# Patient Record
Sex: FEMALE | Race: BLACK OR AFRICAN AMERICAN | Employment: UNEMPLOYED | URBAN - METROPOLITAN AREA
[De-identification: names, ages, dates, MRNs, and addresses within clinical notes are randomized per-mention and may not be internally consistent; named-entity substitution may affect disease eponyms.]

---

## 2018-05-21 ENCOUNTER — HOSPITAL ENCOUNTER (OUTPATIENT)
Age: 58
Setting detail: OUTPATIENT SURGERY
Discharge: HOME | End: 2018-05-21
Attending: INTERNAL MEDICINE | Admitting: INTERNAL MEDICINE
Payer: MEDICARE

## 2018-05-21 ENCOUNTER — ANESTHESIA EVENT (OUTPATIENT)
Dept: ENDOSCOPY | Age: 58
End: 2018-05-21
Payer: MEDICARE

## 2018-05-21 ENCOUNTER — ANESTHESIA (OUTPATIENT)
Dept: ENDOSCOPY | Age: 58
End: 2018-05-21
Payer: MEDICARE

## 2018-05-21 VITALS
OXYGEN SATURATION: 98 % | DIASTOLIC BLOOD PRESSURE: 70 MMHG | BODY MASS INDEX: 35.94 KG/M2 | HEIGHT: 67 IN | TEMPERATURE: 97.8 F | WEIGHT: 229 LBS | RESPIRATION RATE: 10 BRPM | HEART RATE: 70 BPM | SYSTOLIC BLOOD PRESSURE: 126 MMHG

## 2018-05-21 LAB
H PYLORI FROM TISSUE: NEGATIVE
KIT LOT NO., HCLOLOT: NORMAL
NEGATIVE CONTROL: NEGATIVE
POSITIVE CONTROL: POSITIVE

## 2018-05-21 PROCEDURE — 76040000019: Performed by: INTERNAL MEDICINE

## 2018-05-21 PROCEDURE — 74011000250 HC RX REV CODE- 250

## 2018-05-21 PROCEDURE — 76060000031 HC ANESTHESIA FIRST 0.5 HR: Performed by: INTERNAL MEDICINE

## 2018-05-21 PROCEDURE — 77030009426 HC FCPS BIOP ENDOSC BSC -B: Performed by: INTERNAL MEDICINE

## 2018-05-21 PROCEDURE — 74011250636 HC RX REV CODE- 250/636

## 2018-05-21 PROCEDURE — 87077 CULTURE AEROBIC IDENTIFY: CPT | Performed by: INTERNAL MEDICINE

## 2018-05-21 PROCEDURE — 88305 TISSUE EXAM BY PATHOLOGIST: CPT | Performed by: INTERNAL MEDICINE

## 2018-05-21 RX ORDER — OMEPRAZOLE 40 MG/1
40 CAPSULE, DELAYED RELEASE ORAL DAILY
COMMUNITY

## 2018-05-21 RX ORDER — SODIUM CHLORIDE 9 MG/ML
INJECTION, SOLUTION INTRAVENOUS
Status: DISCONTINUED | OUTPATIENT
Start: 2018-05-21 | End: 2018-05-21 | Stop reason: HOSPADM

## 2018-05-21 RX ORDER — ATROPINE SULFATE 0.1 MG/ML
0.5 INJECTION INTRAVENOUS
Status: DISCONTINUED | OUTPATIENT
Start: 2018-05-21 | End: 2018-05-21 | Stop reason: HOSPADM

## 2018-05-21 RX ORDER — FLUMAZENIL 0.1 MG/ML
0.2 INJECTION INTRAVENOUS
Status: DISCONTINUED | OUTPATIENT
Start: 2018-05-21 | End: 2018-05-21 | Stop reason: HOSPADM

## 2018-05-21 RX ORDER — METHOCARBAMOL 750 MG/1
750 TABLET, FILM COATED ORAL 4 TIMES DAILY
COMMUNITY
End: 2018-09-04

## 2018-05-21 RX ORDER — NALOXONE HYDROCHLORIDE 0.4 MG/ML
0.4 INJECTION, SOLUTION INTRAMUSCULAR; INTRAVENOUS; SUBCUTANEOUS
Status: DISCONTINUED | OUTPATIENT
Start: 2018-05-21 | End: 2018-05-21 | Stop reason: HOSPADM

## 2018-05-21 RX ORDER — SUCRALFATE 1 G/10ML
SUSPENSION ORAL 4 TIMES DAILY
COMMUNITY
End: 2018-09-04

## 2018-05-21 RX ORDER — TACROLIMUS 1 MG/1
CAPSULE ORAL EVERY 12 HOURS
COMMUNITY

## 2018-05-21 RX ORDER — METOPROLOL SUCCINATE 25 MG/1
TABLET, EXTENDED RELEASE ORAL DAILY
COMMUNITY

## 2018-05-21 RX ORDER — SODIUM CHLORIDE 0.9 % (FLUSH) 0.9 %
5-10 SYRINGE (ML) INJECTION EVERY 8 HOURS
Status: DISCONTINUED | OUTPATIENT
Start: 2018-05-21 | End: 2018-05-21 | Stop reason: HOSPADM

## 2018-05-21 RX ORDER — ATORVASTATIN CALCIUM 40 MG/1
40 TABLET, FILM COATED ORAL
COMMUNITY

## 2018-05-21 RX ORDER — SODIUM CHLORIDE 0.9 % (FLUSH) 0.9 %
5-10 SYRINGE (ML) INJECTION AS NEEDED
Status: DISCONTINUED | OUTPATIENT
Start: 2018-05-21 | End: 2018-05-21 | Stop reason: HOSPADM

## 2018-05-21 RX ORDER — EPINEPHRINE 0.1 MG/ML
1 INJECTION INTRACARDIAC; INTRAVENOUS
Status: DISCONTINUED | OUTPATIENT
Start: 2018-05-21 | End: 2018-05-21 | Stop reason: HOSPADM

## 2018-05-21 RX ORDER — LIDOCAINE HYDROCHLORIDE 20 MG/ML
INJECTION, SOLUTION EPIDURAL; INFILTRATION; INTRACAUDAL; PERINEURAL AS NEEDED
Status: DISCONTINUED | OUTPATIENT
Start: 2018-05-21 | End: 2018-05-21 | Stop reason: HOSPADM

## 2018-05-21 RX ORDER — PROPOFOL 10 MG/ML
INJECTION, EMULSION INTRAVENOUS AS NEEDED
Status: DISCONTINUED | OUTPATIENT
Start: 2018-05-21 | End: 2018-05-21 | Stop reason: HOSPADM

## 2018-05-21 RX ORDER — FLUTICASONE PROPIONATE 50 MCG
2 SPRAY, SUSPENSION (ML) NASAL
COMMUNITY

## 2018-05-21 RX ORDER — DEXTROMETHORPHAN/PSEUDOEPHED 2.5-7.5/.8
1.2 DROPS ORAL
Status: DISCONTINUED | OUTPATIENT
Start: 2018-05-21 | End: 2018-05-21 | Stop reason: HOSPADM

## 2018-05-21 RX ORDER — MYCOPHENOLATE MOFETIL 500 MG/1
750 TABLET ORAL 2 TIMES DAILY
COMMUNITY

## 2018-05-21 RX ADMIN — SODIUM CHLORIDE: 9 INJECTION, SOLUTION INTRAVENOUS at 14:30

## 2018-05-21 RX ADMIN — PROPOFOL 50 MG: 10 INJECTION, EMULSION INTRAVENOUS at 14:51

## 2018-05-21 RX ADMIN — PROPOFOL 50 MG: 10 INJECTION, EMULSION INTRAVENOUS at 14:46

## 2018-05-21 RX ADMIN — PROPOFOL 50 MG: 10 INJECTION, EMULSION INTRAVENOUS at 14:49

## 2018-05-21 RX ADMIN — PROPOFOL 50 MG: 10 INJECTION, EMULSION INTRAVENOUS at 14:47

## 2018-05-21 RX ADMIN — LIDOCAINE HYDROCHLORIDE 100 MG: 20 INJECTION, SOLUTION EPIDURAL; INFILTRATION; INTRACAUDAL; PERINEURAL at 14:46

## 2018-05-21 RX ADMIN — PROPOFOL 50 MG: 10 INJECTION, EMULSION INTRAVENOUS at 14:54

## 2018-05-21 NOTE — DISCHARGE INSTRUCTIONS
[address]  [address] [place], [place] [address]  [phone]                     DISCHARGE INSTRUCTIONS    Excell Seek  [id number]  1960    DISCOMFORT:  Sore throat- warm salt water gargle  redness at IV site- apply warm compress to area; if redness or soreness persist- contact your physician  Gaseous discomfort- walking, belching will help relieve any discomfort  You may not operate a vehicle for 12 hours  You may not engage in an occupation involving machinery or appliances for rest of today  You may not drink alcoholic beverages for at least 12 hours  Avoid making any critical decisions for at least 24 hour  DIET  You may eat and drink after you leave. You may resume your regular diet - however -  remember your colon is empty and a heavy meal will produce gas. Avoid these foods:  vegetables, fried / greasy foods, carbonated drinks    ACTIVITY  You may resume your normal daily activities   Spend the remainder of the day resting -  avoid any strenuous activity. CALL M.D. ANY SIGN OF   Increasing pain, nausea, vomiting  Abdominal distension (swelling)  New increased bleeding (oral or rectal)  Fever (chills)  Pain in chest area    Shortness of breath    Follow-up Instructions:   Call Dr. Villareal Call for any questions or problems. we took a biopsy please call the office within 2 weeks to discuss your      Gastritis: Care Instructions  Your Care Instructions    Gastritis is a sore and upset stomach. It happens when something irritates the stomach lining. Many things can cause it. These include an infection such as the flu or something you ate or drank. Medicines or a sore on the lining of the stomach (ulcer) also can cause it. Your belly may bloat and ache. You may belch, vomit, and feel sick to your stomach. You should be able to relieve the problem by taking medicine. And it may help to change your diet. If gastritis lasts, your doctor may prescribe medicine.   Follow-up care is a key part of your treatment and safety. Be sure to make and go to all appointments, and call your doctor if you are having problems. It's also a good idea to know your test results and keep a list of the medicines you take. How can you care for yourself at home? · If your doctor prescribed antibiotics, take them as directed. Do not stop taking them just because you feel better. You need to take the full course of antibiotics. · Be safe with medicines. If your doctor prescribed medicine to decrease stomach acid, take it as directed. Call your doctor if you think you are having a problem with your medicine. · Do not take any other medicine, including over-the-counter pain relievers, without talking to your doctor first.  · If your doctor recommends over-the-counter medicine to reduce stomach acid, such as Pepcid AC, Prilosec, Tagamet HB, or Zantac 75, follow the directions on the label. · Drink plenty of fluids (enough so that your urine is light yellow or clear like water) to prevent dehydration. Choose water and other caffeine-free clear liquids. If you have kidney, heart, or liver disease and have to limit fluids, talk with your doctor before you increase the amount of fluids you drink. · Limit how much alcohol you drink. · Avoid coffee, tea, cola drinks, chocolate, and other foods with caffeine. They increase stomach acid. When should you call for help? Call 911 anytime you think you may need emergency care. For example, call if:  ? · You vomit blood or what looks like coffee grounds. ? · You pass maroon or very bloody stools. ?Call your doctor now or seek immediate medical care if:  ? · You start breathing fast and have not produced urine in the last 8 hours. ? · You cannot keep fluids down. ? Watch closely for changes in your health, and be sure to contact your doctor if:  ? · You do not get better as expected. Where can you learn more?   Go to http://shaila-mariel.info/. Enter [id number] in the search box to learn more about \"Gastritis: Care Instructions. \"  Current as of: May 12, 2017  Content Version: 11.4  © 1652-7785 [employer]. Care instructions adapted under license by [employer] (which disclaims liability or warranty for this information). If you have questions about a medical condition or this instruction, always ask your healthcare professional. [place] [address] any warranty or liability for your use of this information. pathology results. Telephone # [phone]        Continue same medications.     Post procedure diagnosis:  1.- Mild Gastritis

## 2018-05-21 NOTE — IP AVS SNAPSHOT
[address] 
[phone] Patient: Jackey Severs MRN: [id number] WWW:[id number] About your hospitalization You were admitted on:  May 21, 2018 [facility] You were discharged on:  May 21, 2018 Why you were hospitalized Your primary diagnosis was:  Not on File Follow-up Information Follow up With Details Comments Contact Info Betina Blackman MD   [address] [place] [address] Way 
[phone] Discharge Orders None A check alexandre indicates which time of day the medication should be taken. My Medications CONTINUE taking these medications Instructions Each Dose to Equal  
 Morning Noon Evening Bedtime ADVAIR DISKUS 250-50 mcg/dose diskus inhaler Generic drug:  fluticasone-salmeterol Your last dose was: Your next dose is: Take 1 Puff by inhalation every twelve (12) hours. 1 Puff  
    
   
   
   
  
 albuterol 90 mcg/actuation inhaler Commonly known as:  PROVENTIL HFA, VENTOLIN HFA, PROAIR HFA Your last dose was: Your next dose is: Take 1 Puff by inhalation. 1 Puff  
    
   
   
   
  
 atorvastatin 40 mg tablet Commonly known as:  LIPITOR Your last dose was: Your next dose is: Take 40 mg by mouth daily. 40 mg  
    
   
   
   
  
 CARAFATE 100 mg/mL suspension Generic drug:  sucralfate Your last dose was: Your next dose is: Take  by mouth four (4) times daily. famotidine 20 mg tablet Commonly known as:  PEPCID Your last dose was: Your next dose is: Take 20 mg by mouth daily. 20 mg  
    
   
   
   
  
 fluticasone 50 mcg/actuation nasal spray Commonly known as:  Star Serve Your last dose was: Your next dose is: 2 Sprays by Both Nostrils route daily. 2 Spray  
    
   
   
   
  
 furosemide 20 mg tablet Commonly known as:  LASIX Your last dose was: Your next dose is: Take 40 mg by mouth daily. 40 mg  
    
   
   
   
  
 methocarbamol 750 mg tablet Commonly known as:  ROBAXIN Your last dose was: Your next dose is: Take 750 mg by mouth four (4) times daily. 750 mg  
    
   
   
   
  
 metoprolol succinate 25 mg XL tablet Commonly known as:  TOPROL-XL Your last dose was: Your next dose is: Take  by mouth daily. mycophenolate 500 mg tablet Commonly known as:  CELLCEPT Your last dose was: Your next dose is: Take 1,500 mg by mouth two (2) times a day. 1500 mg  
    
   
   
   
  
 omeprazole 40 mg capsule Commonly known as:  PRILOSEC Your last dose was: Your next dose is: Take 40 mg by mouth daily. 40 mg  
    
   
   
   
  
 predniSONE 20 mg tablet Commonly known as:  Pansy Hummingbird Your last dose was: Your next dose is: Take 20 mg by mouth daily. 20 mg  
    
   
   
   
  
 tacrolimus 1 mg capsule Commonly known as:  PROGRAF Your last dose was: Your next dose is: Take  by mouth every twelve (12) hours. Discharge Instructions [address] [facility] 
[address] [facility] [address] [place], [address]  
[phone] DISCHARGE INSTRUCTIONS [facility] [id number] 1960 DISCOMFORT: 
Sore throat- warm salt water gargle 
redness at IV site- apply warm compress to area; if redness or soreness persist- contact your physician Gaseous discomfort- walking, belching will help relieve any discomfort You may not operate a vehicle for 12 hours You may not engage in an occupation involving machinery or appliances for rest of today You may not drink alcoholic beverages for at least 12 hours Avoid making any critical decisions for at least 24 hour DIET You may eat and drink after you leave. You may resume your regular diet  however -  remember your colon is empty and a heavy meal will produce gas. Avoid these foods:  vegetables, fried / greasy foods, carbonated drinks ACTIVITY You may resume your normal daily activities Spend the remainder of the day resting -  avoid any strenuous activity. CALL M.D. ANY SIGN OF Increasing pain, nausea, vomiting Abdominal distension (swelling) New increased bleeding (oral or rectal) Fever (chills) Pain in chest area Shortness of breath Follow-up Instructions: 
 Call Dr. Abby Lundy for any questions or problems. we took a biopsy please call the office within 2 weeks to discuss your Gastritis: Care Instructions Your Care Instructions Gastritis is a sore and upset stomach. It happens when something irritates the stomach lining. Many things can cause it. These include an infection such as the flu or something you ate or drank. Medicines or a sore on the lining of the stomach (ulcer) also can cause it. Your belly may bloat and ache. You may belch, vomit, and feel sick to your stomach. You should be able to relieve the problem by taking medicine. And it may help to change your diet. If gastritis lasts, your doctor may prescribe medicine. Follow-up care is a key part of your treatment and safety. Be sure to make and go to all appointments, and call your doctor if you are having problems. It's also a good idea to know your test results and keep a list of the medicines you take. How can you care for yourself at home? · If your doctor prescribed antibiotics, take them as directed. Do not stop taking them just because you feel better. You need to take the full course of antibiotics. · Be safe with medicines.  If your doctor prescribed medicine to decrease stomach acid, take it as directed. Call your doctor if you think you are having a problem with your medicine. · Do not take any other medicine, including over-the-counter pain relievers, without talking to your doctor first. 
· If your doctor recommends over-the-counter medicine to reduce stomach acid, such as Pepcid AC, Prilosec, Tagamet HB, or Zantac 75, follow the directions on the label. · Drink plenty of fluids (enough so that your urine is light yellow or clear like water) to prevent dehydration. Choose water and other caffeine-free clear liquids. If you have kidney, heart, or liver disease and have to limit fluids, talk with your doctor before you increase the amount of fluids you drink. · Limit how much alcohol you drink. · Avoid coffee, tea, cola drinks, chocolate, and other foods with caffeine. They increase stomach acid. When should you call for help? Call 911 anytime you think you may need emergency care. For example, call if: 
? · You vomit blood or what looks like coffee grounds. ? · You pass maroon or very bloody stools. ?Call your doctor now or seek immediate medical care if: 
? · You start breathing fast and have not produced urine in the last 8 hours. ? · You cannot keep fluids down. ? Watch closely for changes in your health, and be sure to contact your doctor if: 
? · You do not get better as expected. Where can you learn more? Go to http://shaila-mariel.info/. Enter [id number] in the search box to learn more about \"Gastritis: Care Instructions. \" Current as of: May 12, 2017 Content Version: 11.4 © 7409-7304 [employer]. Care instructions adapted under license by [employer] (which disclaims liability or warranty for this information). If you have questions about a medical condition or this instruction, always ask your healthcare professional. [address] any warranty or liability for your use of this information. pathology results. Telephone # [phone] Continue same medications. Post procedure diagnosis:  1.- Mild Gastritis Introducing [facility] [place] [facility]! [employer] introduces Herotainment patient portal. Now you can access parts of your medical record, email your doctor's office, and request medication refills online. 1. In your internet browser, go to [email or web address] 2. Click on the First Time User? Click Here link in the Sign In box. You will see the New Member Sign Up page. 3. Enter your Herotainment Access Code exactly as it appears below. You will not need to use this code after youve completed the sign-up process. If you do not sign up before the expiration date, you must request a new code. · Herotainment Access Code: [email or web address] Expires: 8/19/2018  3:10 PM 
 
4. Enter the last four digits of your Social Security Number (xxxx) and Date of Birth (mm/dd/yyyy) as indicated and click Submit. You will be taken to the next sign-up page. 5. Create a Herotainment ID. This will be your Herotainment login ID and cannot be changed, so think of one that is secure and easy to remember. 6. Create a Herotainment password. You can change your password at any time. 7. Enter your Password Reset Question and Answer. This can be used at a later time if you forget your password. 8. Enter your e-mail address. You will receive e-mail notification when new information is available in [address] 9. Click Sign Up. You can now view and download portions of your medical record. 10. Click the Download Summary menu link to download a portable copy of your medical information. If you have questions, please visit the Frequently Asked Questions section of the Herotainment website. Remember, Herotainment is NOT to be used for urgent needs. For medical emergencies, dial 911. Now available from your iPhone and Android! Introducing Dex Tony As a [facility] patient, I wanted to make you aware of our electronic visit tool called Dex HassanDune Science. Mobilligy/7 allows you to connect within minutes with a medical provider 24 hours a day, seven days a week via a mobile device or tablet or logging into a secure website from your computer. You can access Dex Opta Sportsdatabiggfin from anywhere in the [place]. A virtual visit might be right for you when you have a simple condition and feel like you just dont want to get out of bed, or cant get away from work for an appointment, when your regular Doe [facility] provider is not available (evenings, weekends or holidays), or when youre out of town and need minor care. Electronic visits cost only $49 and if the Mobilligy/7 provider determines a prescription is needed to treat your condition, one can be electronically transmitted to a nearby pharmacy*. Please take a moment to enroll today if you have not already done so. The enrollment process is free and takes just a few minutes. To enroll, please download the Mobilligy/FatTail ted to your tablet or phone, or visit [email or web address] to enroll on your computer. And, as an [address] [place] [address] patient with a [email or web address] account, the results of your visits will be scanned into your electronic medical record and your primary care provider will be able to view the scanned results. We urge you to continue to see your regular [facility] provider for your ongoing medical care. And while your primary care provider may not be the one available when you seek a Dex Tony virtual visit, the peace of mind you get from getting a real diagnosis real time can be priceless. For more information on Dex Tony, view our Frequently Asked Questions (FAQs) at [email or web address]. Sincerely, 
 
Francheska Gamboa MD 
Chief Medical Officer Katheryn8 Helen Rodriguez *:  certain medications cannot be prescribed via Dex Tony Providers Seen During Your Hospitalization Provider Specialty Primary office phone Sally Hines MD Gastroenterology [phone] Your Primary Care Physician (PCP) Primary Care Physician Office Phone Office Fax Bernice Leon [phone] You are allergic to the following No active allergies Recent Documentation Height Weight BMI OB Status Smoking Status 1.702 m 103.9 kg 35.87 kg/m2 Menopause Current Every Day Smoker Emergency Contacts Name Discharge Info Relation Home Work Mobile [facility] DISCHARGE CAREGIVER [3] Daughter [21] [phone] Patient Belongings The following personal items are in your possession at time of discharge: 
  Dental Appliances: None Please provide this summary of care documentation to your next provider. Signatures-by signing, you are acknowledging that this After Visit Summary has been reviewed with you and you have received a copy. Patient Signature:  ____________________________________________________________ Date:  ____________________________________________________________  
  
Braulio Cart Provider Signature:  ____________________________________________________________ Date:  ____________________________________________________________

## 2018-05-21 NOTE — ROUTINE PROCESS
Jay Gonzales  1960  [id number]    Situation:  Verbal report received from: Christi Chung RN  Procedure: Procedure(s):  ESOPHAGOGASTRODUODENOSCOPY (EGD)  ESOPHAGOGASTRODUODENAL (EGD) BIOPSY    Background:    Preoperative diagnosis: DYSPHAGIA, THROAT SYMPTOM-FEELS SWOLLEN, TONGUE SYMPTOMS-FEEL BUMPS BACK OF TONGUE  Postoperative diagnosis: 1.- Mild Gastritis    :  Dr. Constance Puente  Assistant(s): Endoscopy Technician-1: Roxanne Petersen  Endoscopy RN-1: Aide Núñez RN    Specimens:   ID Type Source Tests Collected by Time Destination   1 : Distal Esopahgus Biopsies Preservative   Girish Perry MD 5/21/2018 1452 Pathology   2 : Mid Esophagus Biopsies Preservative   Girish Perry MD 5/21/2018 1456 Pathology     H. Pylori  yes    Assessment:  Intra-procedure medications   Anesthesia gave intra-procedure sedation and medications, see anesthesia flow sheet yes    Intravenous fluids: NS@ KVO     Vital signs stable     Abdominal assessment: round and soft     Recommendation:  Discharge patient per MD order.     Family or Friend   Permission to share finding with family or friend yes

## 2018-05-21 NOTE — PROCEDURES
[address]  Rupali Desouza, [address]                 NAME:  Mackenzie Hutchinson   :   1960   MRN:   [id number]     Date/Time:  2018 2:58 PM    Esophagogastroduodenoscopy (EGD) Procedure Note    : Makenzie Schaefer MD    Referring Provider:  Chelita Caraballo MD    Anethesia/Sedation:  MAC anesthesia Propofol    Preoperative diagnosis: DYSPHAGIA, THROAT SYMPTOM-FEELS SWOLLEN, TONGUE SYMPTOMS-FEEL BUMPS BACK OF TONGUE    Postoperative diagnosis: 1.- Mild Gastritis    Procedure Details     After infom consent was obtained for the procedure, with all risks and benefits of procedure explained the patient was taken to the endoscopy suite and placed in the left lateral decubitus position. Following sequential administration of sedation as per above, the [id number] gastroscope was inserted into the mouth and advanced under direct vision to second portion of the duodenum. A careful inspection was made as the gastroscope was withdrawn, including a retroflexed view of the proximal stomach; findings and interventions are described below. Findings:  Esophagus:normal, random biopsies taken from distal and mid esophagus (Jar 1 and 2 respectively)  Stomach:Mild erythema in the antrum, biopsies taken for RUTHIE. Duodenum/jejunum:normal      Therapies:  none    Specimens: as listed           EBL: None    Complications:   None; patient tolerated the procedure well. Impression:    1.- Mild Gastritis    Recommendations:  -Continue acid suppression. , -Await pathology. , -Await RUTHIE test result and treat for Helicobacter pylori if positive.     Discharge disposition:  Home in the company of  when able to ambulate    Makenzie Schaefer MD

## 2018-05-21 NOTE — H&P
[address]  [address] [facility], [address]  [phone]                                History and Physical     NAME: Mona Nuno   :  1960   MRN:  [id number]     HPI:  The patient was seen and examined. Past Surgical History:   Procedure Laterality Date    HX ORTHOPAEDIC      right great toe amputation 2012    HX TRANSPLANT  2016    kidney    NEUROLOGICAL PROCEDURE UNLISTED      neck surgery     Past Medical History:   Diagnosis Date    Asthma     Chronic kidney disease     Hypertension      Social History   Substance Use Topics    Smoking status: Current Every Day Smoker     Packs/day: 0.00    Smokeless tobacco: Never Used      Comment: 1-2 cigarettes per day    Alcohol use Yes      Comment: 2-3 drinks 1-2 times a week     No Known Allergies  Family History   Problem Relation Age of Onset    Heart Attack Mother     Kidney Disease Brother      transplant    Kidney Disease Paternal Grandmother     Kidney Disease Paternal Grandfather      No current facility-administered medications for this encounter. PHYSICAL EXAM:  General: Alert, cooperative, no acute distress    HEENT: NC, Atraumatic. PERRLA, EOMI. Anicteric sclerae. Lungs:  CTA Bilaterally  Heart:  Regular  Rate and Rhythm  Abdomen: Soft, Non distended, Non tender.  +Bowel sounds  Extremities: No edema  Neurologic:  CN 2-12 grossly intact, Alert and oriented X 3. No acute neurological distress   Psych:   Good insight. Not anxious nor agitated. The heart, lungs and mental status were satisfactory for the administration of sedation and for the procedure.       Mallampati score: 3       Assessment:   · dysphagia    Plan:   · Endoscopic procedure  · MAC sedation   ·

## 2018-05-21 NOTE — ANESTHESIA POSTPROCEDURE EVALUATION
Post-Anesthesia Evaluation and Assessment    Patient: Hao Mueller MRN: [id number]  SSN: [id number]    YOB: 1960  Age: 56 y.o. Sex: female       Cardiovascular Function/Vital Signs  Visit Vitals    /82    Pulse 84    Temp 36 °C (96.8 °F)    Resp 12    Ht 5' 7\" (1.702 m)    Wt 103.9 kg (229 lb)    SpO2 95%    BMI 35.87 kg/m2       Patient is status post MAC anesthesia for Procedure(s):  ESOPHAGOGASTRODUODENOSCOPY (EGD)  ESOPHAGOGASTRODUODENAL (EGD) BIOPSY. Nausea/Vomiting: None    Postoperative hydration reviewed and adequate. Pain:  Pain Scale 1: Numeric (0 - 10) (05/21/18 1403)  Pain Intensity 1: 0 (05/21/18 1403)   Managed    Neurological Status: At baseline    Mental Status and Level of Consciousness: Arousable    Pulmonary Status:   O2 Device: CO2 nasal cannula (05/21/18 1456)   Adequate oxygenation and airway patent    Complications related to anesthesia: None    Post-anesthesia assessment completed.  No concerns    Signed By: Naomi Hurley MD     May 21, 2018

## 2018-05-21 NOTE — ANESTHESIA PREPROCEDURE EVALUATION
Anesthetic History   No history of anesthetic complications            Review of Systems / Medical History  Patient summary reviewed, nursing notes reviewed and pertinent labs reviewed    Pulmonary            Asthma : well controlled       Neuro/Psych   Within defined limits           Cardiovascular    Hypertension              Exercise tolerance: >4 METS     GI/Hepatic/Renal               Comments: Dysphagia  Renal tx Endo/Other        Morbid obesity     Other Findings            Physical Exam    Airway  Mallampati: I  TM Distance: > 6 cm  Neck ROM: normal range of motion   Mouth opening: Normal     Cardiovascular    Rhythm: regular  Rate: normal         Dental         Pulmonary  Breath sounds clear to auscultation               Abdominal         Other Findings            Anesthetic Plan    ASA: 3  Anesthesia type: MAC          Induction: Intravenous  Anesthetic plan and risks discussed with: Patient

## 2018-05-21 NOTE — PROGRESS NOTES

## 2018-06-28 ENCOUNTER — HOSPITAL ENCOUNTER (OUTPATIENT)
Dept: ULTRASOUND IMAGING | Age: 58
Discharge: HOME | End: 2018-06-28
Attending: OTOLARYNGOLOGY
Payer: MEDICARE

## 2018-06-28 DIAGNOSIS — D34 THYROID ADENOMA: ICD-10-CM

## 2018-06-28 PROCEDURE — 76536 US EXAM OF HEAD AND NECK: CPT

## 2018-08-02 ENCOUNTER — HOSPITAL ENCOUNTER (OUTPATIENT)
Dept: ULTRASOUND IMAGING | Age: 58
Discharge: HOME | End: 2018-08-02
Attending: OTOLARYNGOLOGY
Payer: MEDICARE

## 2018-08-02 DIAGNOSIS — D34 THYROID ADENOMA: ICD-10-CM

## 2018-08-02 PROCEDURE — 88172 CYTP DX EVAL FNA 1ST EA SITE: CPT | Performed by: OTOLARYNGOLOGY

## 2018-08-02 PROCEDURE — 10022 US GUIDE FINE NDL ASP W IMAGE: CPT

## 2018-08-02 PROCEDURE — 88173 CYTOPATH EVAL FNA REPORT: CPT | Performed by: OTOLARYNGOLOGY

## 2018-08-02 RX ORDER — LIDOCAINE HYDROCHLORIDE 10 MG/ML
5 INJECTION, SOLUTION EPIDURAL; INFILTRATION; INTRACAUDAL; PERINEURAL
Status: DISPENSED | OUTPATIENT
Start: 2018-08-02 | End: 2018-08-03

## 2018-08-02 NOTE — DISCHARGE INSTRUCTIONS
[facility]  Department of Radiology  [phone] Ultrasound Department  [phone] Emergency Department    BIOPSY DISCHARGE INSTRUCTIONS for Amelia [place] on 8/2/2018  General Instructions:  - A biopsy is the removal of a small piece of tissue for microscopic examination or testing.  - Healthy tissue can be obtained for the purpose of tissue-type matching for transplants. - Unhealthy tissues are more commonly biopsied to diagnose disease. General Biopsy:  - A mass can grow in any area of the body, and we are taking a specimen as ordered by your doctor. - The risks are the same. They include bleeding, pain, and infection. Home Care Instructions:  - You may resume your regular diet and medication regimen. - You may use over the counter acetaminophen (Tylenol) or ibuprofen (Advil) for the soreness. - You may apply an ice pack to the affected area for 20-30 minutes at time for the first 24 hours. -- After that, you may apply a heat pack. - You may remove the bandaid(s) tonight. - You may take a shower tonight. - Please keep the site clean and dry for 24-48 hours. - Do not soak in any kind of water (bath tub, hot tub, pool, ocean, etc) for 24-48 hours. - Do not participate in any kind of activity making you vigorously sweat for 24-48 hours. - Do not use any moisturizer/makeup/perfume on the site for 24-48 hours. Call If:  - You should call Dr. Faye Lord if you have any questions or concerns about the biopsy site. - Call if you should have increased pain, fever, redness, drainage, or bleeding more than a small spot on the bandage. Follow-Up Instructions:  - Please see Dr. Faye Lord as she has requested.

## 2018-09-04 ENCOUNTER — HOSPITAL ENCOUNTER (OUTPATIENT)
Dept: PREADMISSION TESTING | Age: 58
Discharge: HOME | End: 2018-09-04
Payer: MEDICARE

## 2018-09-04 ENCOUNTER — HOSPITAL ENCOUNTER (OUTPATIENT)
Dept: GENERAL RADIOLOGY | Age: 58
Discharge: HOME | End: 2018-09-04
Attending: OTOLARYNGOLOGY
Payer: MEDICARE

## 2018-09-04 VITALS
TEMPERATURE: 98 F | HEIGHT: 67 IN | RESPIRATION RATE: 20 BRPM | DIASTOLIC BLOOD PRESSURE: 82 MMHG | SYSTOLIC BLOOD PRESSURE: 146 MMHG | BODY MASS INDEX: 35.87 KG/M2 | HEART RATE: 69 BPM | WEIGHT: 228.5 LBS

## 2018-09-04 LAB
ALBUMIN SERPL-MCNC: 3.8 G/DL (ref 3.5–5)
ALBUMIN/GLOB SERPL: 1.2 {RATIO} (ref 1.1–2.2)
ALP SERPL-CCNC: 85 U/L (ref 45–117)
ALT SERPL-CCNC: 36 U/L (ref 12–78)
ANION GAP SERPL CALC-SCNC: 10 MMOL/L (ref 5–15)
AST SERPL-CCNC: 22 U/L (ref 15–37)
BASOPHILS # BLD: 0 K/UL (ref 0–0.1)
BASOPHILS NFR BLD: 0 % (ref 0–1)
BILIRUB SERPL-MCNC: 0.4 MG/DL (ref 0.2–1)
BUN SERPL-MCNC: 21 MG/DL (ref 6–20)
BUN/CREAT SERPL: 16 (ref 12–20)
CALCIUM SERPL-MCNC: 8.5 MG/DL (ref 8.5–10.1)
CHLORIDE SERPL-SCNC: 109 MMOL/L (ref 97–108)
CO2 SERPL-SCNC: 24 MMOL/L (ref 21–32)
CREAT SERPL-MCNC: 1.28 MG/DL (ref 0.55–1.02)
DIFFERENTIAL METHOD BLD: ABNORMAL
EOSINOPHIL # BLD: 0.1 K/UL (ref 0–0.4)
EOSINOPHIL NFR BLD: 1 % (ref 0–7)
ERYTHROCYTE [DISTWIDTH] IN BLOOD BY AUTOMATED COUNT: 13.7 % (ref 11.5–14.5)
GLOBULIN SER CALC-MCNC: 3.3 G/DL (ref 2–4)
GLUCOSE SERPL-MCNC: 158 MG/DL (ref 65–100)
HCT VFR BLD AUTO: 38.7 % (ref 35–47)
HGB BLD-MCNC: 12.5 G/DL (ref 11.5–16)
IMM GRANULOCYTES # BLD: 0 K/UL (ref 0–0.04)
IMM GRANULOCYTES NFR BLD AUTO: 0 % (ref 0–0.5)
LYMPHOCYTES # BLD: 1 K/UL (ref 0.8–3.5)
LYMPHOCYTES NFR BLD: 14 % (ref 12–49)
MCH RBC QN AUTO: 30.2 PG (ref 26–34)
MCHC RBC AUTO-ENTMCNC: 32.3 G/DL (ref 30–36.5)
MCV RBC AUTO: 93.5 FL (ref 80–99)
MONOCYTES # BLD: 0.6 K/UL (ref 0–1)
MONOCYTES NFR BLD: 9 % (ref 5–13)
NEUTS SEG # BLD: 5.7 K/UL (ref 1.8–8)
NEUTS SEG NFR BLD: 76 % (ref 32–75)
NRBC # BLD: 0 K/UL (ref 0–0.01)
NRBC BLD-RTO: 0 PER 100 WBC
PLATELET # BLD AUTO: 180 K/UL (ref 150–400)
PMV BLD AUTO: 12.4 FL (ref 8.9–12.9)
POTASSIUM SERPL-SCNC: 3.7 MMOL/L (ref 3.5–5.1)
PROT SERPL-MCNC: 7.1 G/DL (ref 6.4–8.2)
RBC # BLD AUTO: 4.14 M/UL (ref 3.8–5.2)
SODIUM SERPL-SCNC: 143 MMOL/L (ref 136–145)
TSH SERPL DL<=0.05 MIU/L-ACNC: 0.52 UIU/ML (ref 0.36–3.74)
WBC # BLD AUTO: 7.4 K/UL (ref 3.6–11)

## 2018-09-04 PROCEDURE — 84443 ASSAY THYROID STIM HORMONE: CPT | Performed by: OTOLARYNGOLOGY

## 2018-09-04 PROCEDURE — 71046 X-RAY EXAM CHEST 2 VIEWS: CPT

## 2018-09-04 PROCEDURE — 80053 COMPREHEN METABOLIC PANEL: CPT | Performed by: OTOLARYNGOLOGY

## 2018-09-04 PROCEDURE — 36415 COLL VENOUS BLD VENIPUNCTURE: CPT | Performed by: OTOLARYNGOLOGY

## 2018-09-04 PROCEDURE — 85025 COMPLETE CBC W/AUTO DIFF WBC: CPT | Performed by: OTOLARYNGOLOGY

## 2018-09-04 PROCEDURE — 93005 ELECTROCARDIOGRAM TRACING: CPT

## 2018-09-05 LAB
ATRIAL RATE: 64 BPM
CALCULATED P AXIS, ECG09: 52 DEGREES
CALCULATED R AXIS, ECG10: 43 DEGREES
CALCULATED T AXIS, ECG11: 28 DEGREES
DIAGNOSIS, 93000: NORMAL
P-R INTERVAL, ECG05: 196 MS
Q-T INTERVAL, ECG07: 420 MS
QRS DURATION, ECG06: 84 MS
QTC CALCULATION (BEZET), ECG08: 433 MS
VENTRICULAR RATE, ECG03: 64 BPM

## 2018-10-12 ENCOUNTER — ANESTHESIA EVENT (OUTPATIENT)
Dept: MEDSURG UNIT | Age: 58
End: 2018-10-12
Payer: MEDICARE

## 2018-10-12 ENCOUNTER — ANESTHESIA (OUTPATIENT)
Dept: MEDSURG UNIT | Age: 58
End: 2018-10-12
Payer: MEDICARE

## 2018-10-12 ENCOUNTER — HOSPITAL ENCOUNTER (OUTPATIENT)
Age: 58
Setting detail: OBSERVATION
Discharge: HOME | End: 2018-10-13
Attending: OTOLARYNGOLOGY | Admitting: OTOLARYNGOLOGY
Payer: MEDICARE

## 2018-10-12 DIAGNOSIS — E04.2 MULTINODULAR GOITER: Primary | ICD-10-CM

## 2018-10-12 PROBLEM — R13.10 DYSPHAGIA: Status: ACTIVE | Noted: 2018-10-12

## 2018-10-12 PROBLEM — J44.9 COPD (CHRONIC OBSTRUCTIVE PULMONARY DISEASE) (HCC): Status: ACTIVE | Noted: 2018-10-12

## 2018-10-12 LAB
CALCIUM SERPL-MCNC: 8.4 MG/DL (ref 8.5–10.1)
MAGNESIUM SERPL-MCNC: 1.6 MG/DL (ref 1.6–2.4)
PHOSPHATE SERPL-MCNC: 3.2 MG/DL (ref 2.6–4.7)

## 2018-10-12 PROCEDURE — 77030018836 HC SOL IRR NACL ICUM -A: Performed by: OTOLARYNGOLOGY

## 2018-10-12 PROCEDURE — 74011250636 HC RX REV CODE- 250/636

## 2018-10-12 PROCEDURE — 77030010120 HC SHR COAG HARMO J&J -E: Performed by: OTOLARYNGOLOGY

## 2018-10-12 PROCEDURE — 77030010938 HC CLP LIG TELE -A: Performed by: OTOLARYNGOLOGY

## 2018-10-12 PROCEDURE — 94760 N-INVAS EAR/PLS OXIMETRY 1: CPT

## 2018-10-12 PROCEDURE — 77030021052 HC RNG RETRCTR STAY COOP -A: Performed by: OTOLARYNGOLOGY

## 2018-10-12 PROCEDURE — 77030014008 HC SPNG HEMSTAT J&J -C: Performed by: OTOLARYNGOLOGY

## 2018-10-12 PROCEDURE — 74011250637 HC RX REV CODE- 250/637: Performed by: ANESTHESIOLOGY

## 2018-10-12 PROCEDURE — 77030011267 HC ELECTRD BLD COVD -A: Performed by: OTOLARYNGOLOGY

## 2018-10-12 PROCEDURE — 76210000032 HC AMBSU PH I REC 3 TO 3.5 HR: Performed by: OTOLARYNGOLOGY

## 2018-10-12 PROCEDURE — 76030000003 HC AMB SURG OR TIME 1.5 TO 2: Performed by: OTOLARYNGOLOGY

## 2018-10-12 PROCEDURE — 77030032988 HC TU ET NIM TRIVNTG EMG MEDT -D: Performed by: OTOLARYNGOLOGY

## 2018-10-12 PROCEDURE — 99218 HC RM OBSERVATION: CPT

## 2018-10-12 PROCEDURE — 77030020782 HC GWN BAIR PAWS FLX 3M -B

## 2018-10-12 PROCEDURE — 77030031139 HC SUT VCRL2 J&J -A: Performed by: OTOLARYNGOLOGY

## 2018-10-12 PROCEDURE — 74011250636 HC RX REV CODE- 250/636: Performed by: OTOLARYNGOLOGY

## 2018-10-12 PROCEDURE — 83735 ASSAY OF MAGNESIUM: CPT | Performed by: OTOLARYNGOLOGY

## 2018-10-12 PROCEDURE — 94640 AIRWAY INHALATION TREATMENT: CPT

## 2018-10-12 PROCEDURE — 74011250637 HC RX REV CODE- 250/637: Performed by: OTOLARYNGOLOGY

## 2018-10-12 PROCEDURE — 84100 ASSAY OF PHOSPHORUS: CPT | Performed by: OTOLARYNGOLOGY

## 2018-10-12 PROCEDURE — 36415 COLL VENOUS BLD VENIPUNCTURE: CPT | Performed by: OTOLARYNGOLOGY

## 2018-10-12 PROCEDURE — 82310 ASSAY OF CALCIUM: CPT | Performed by: OTOLARYNGOLOGY

## 2018-10-12 PROCEDURE — 77030026438 HC STYL ET INTUB CARD -A: Performed by: ANESTHESIOLOGY

## 2018-10-12 PROCEDURE — 77030002996 HC SUT SLK J&J -A: Performed by: OTOLARYNGOLOGY

## 2018-10-12 PROCEDURE — 74011000250 HC RX REV CODE- 250

## 2018-10-12 PROCEDURE — 74011000250 HC RX REV CODE- 250: Performed by: OTOLARYNGOLOGY

## 2018-10-12 PROCEDURE — 88307 TISSUE EXAM BY PATHOLOGIST: CPT | Performed by: OTOLARYNGOLOGY

## 2018-10-12 PROCEDURE — 74011250636 HC RX REV CODE- 250/636: Performed by: ANESTHESIOLOGY

## 2018-10-12 PROCEDURE — 76060000063 HC AMB SURG ANES 1.5 TO 2 HR: Performed by: OTOLARYNGOLOGY

## 2018-10-12 PROCEDURE — 77030008698 HC TU ET REINF MEDT -D: Performed by: ANESTHESIOLOGY

## 2018-10-12 PROCEDURE — 77030011640 HC PAD GRND REM COVD -A: Performed by: OTOLARYNGOLOGY

## 2018-10-12 PROCEDURE — 77030019655 HC PRB STIM CRAN MEDT -B: Performed by: OTOLARYNGOLOGY

## 2018-10-12 PROCEDURE — 77030002933 HC SUT MCRYL J&J -A: Performed by: OTOLARYNGOLOGY

## 2018-10-12 RX ORDER — ROCURONIUM BROMIDE 10 MG/ML
INJECTION, SOLUTION INTRAVENOUS AS NEEDED
Status: DISCONTINUED | OUTPATIENT
Start: 2018-10-12 | End: 2018-10-12 | Stop reason: HOSPADM

## 2018-10-12 RX ORDER — PROPOFOL 10 MG/ML
INJECTION, EMULSION INTRAVENOUS AS NEEDED
Status: DISCONTINUED | OUTPATIENT
Start: 2018-10-12 | End: 2018-10-12 | Stop reason: HOSPADM

## 2018-10-12 RX ORDER — ALBUTEROL SULFATE 90 UG/1
2 AEROSOL, METERED RESPIRATORY (INHALATION)
Status: DISCONTINUED | OUTPATIENT
Start: 2018-10-12 | End: 2018-10-12 | Stop reason: CLARIF

## 2018-10-12 RX ORDER — OXYCODONE AND ACETAMINOPHEN 5; 325 MG/1; MG/1
1 TABLET ORAL
Status: DISCONTINUED | OUTPATIENT
Start: 2018-10-12 | End: 2018-10-13 | Stop reason: HOSPADM

## 2018-10-12 RX ORDER — ACETAMINOPHEN 325 MG/1
650 TABLET ORAL
Status: DISCONTINUED | OUTPATIENT
Start: 2018-10-12 | End: 2018-10-13 | Stop reason: HOSPADM

## 2018-10-12 RX ORDER — FENTANYL CITRATE 50 UG/ML
INJECTION, SOLUTION INTRAMUSCULAR; INTRAVENOUS AS NEEDED
Status: DISCONTINUED | OUTPATIENT
Start: 2018-10-12 | End: 2018-10-12 | Stop reason: HOSPADM

## 2018-10-12 RX ORDER — CARBOXYMETHYLCELLULOSE SODIUM 5 MG/ML
1 SOLUTION/ DROPS OPHTHALMIC AS NEEDED
Status: DISCONTINUED | OUTPATIENT
Start: 2018-10-12 | End: 2018-10-13 | Stop reason: HOSPADM

## 2018-10-12 RX ORDER — OXYCODONE AND ACETAMINOPHEN 10; 325 MG/1; MG/1
1 TABLET ORAL
Status: DISCONTINUED | OUTPATIENT
Start: 2018-10-12 | End: 2018-10-13 | Stop reason: HOSPADM

## 2018-10-12 RX ORDER — ACETAMINOPHEN 10 MG/ML
1000 INJECTION, SOLUTION INTRAVENOUS ONCE
Status: COMPLETED | OUTPATIENT
Start: 2018-10-12 | End: 2018-10-12

## 2018-10-12 RX ORDER — SODIUM CHLORIDE, SODIUM LACTATE, POTASSIUM CHLORIDE, CALCIUM CHLORIDE 600; 310; 30; 20 MG/100ML; MG/100ML; MG/100ML; MG/100ML
INJECTION, SOLUTION INTRAVENOUS
Status: DISCONTINUED | OUTPATIENT
Start: 2018-10-12 | End: 2018-10-12 | Stop reason: HOSPADM

## 2018-10-12 RX ORDER — BUDESONIDE 0.5 MG/2ML
500 INHALANT ORAL
Status: DISCONTINUED | OUTPATIENT
Start: 2018-10-12 | End: 2018-10-13 | Stop reason: HOSPADM

## 2018-10-12 RX ORDER — ONDANSETRON 2 MG/ML
INJECTION INTRAMUSCULAR; INTRAVENOUS AS NEEDED
Status: DISCONTINUED | OUTPATIENT
Start: 2018-10-12 | End: 2018-10-12 | Stop reason: HOSPADM

## 2018-10-12 RX ORDER — FAMOTIDINE 20 MG/1
20 TABLET, FILM COATED ORAL DAILY
Status: DISCONTINUED | OUTPATIENT
Start: 2018-10-13 | End: 2018-10-13 | Stop reason: HOSPADM

## 2018-10-12 RX ORDER — FLUTICASONE PROPIONATE AND SALMETEROL 250; 50 UG/1; UG/1
1 POWDER RESPIRATORY (INHALATION) EVERY 12 HOURS
Status: DISCONTINUED | OUTPATIENT
Start: 2018-10-12 | End: 2018-10-12 | Stop reason: CLARIF

## 2018-10-12 RX ORDER — DIPHENHYDRAMINE HYDROCHLORIDE 50 MG/ML
12.5 INJECTION, SOLUTION INTRAMUSCULAR; INTRAVENOUS AS NEEDED
Status: DISCONTINUED | OUTPATIENT
Start: 2018-10-12 | End: 2018-10-12 | Stop reason: HOSPADM

## 2018-10-12 RX ORDER — LIDOCAINE HYDROCHLORIDE 10 MG/ML
0.1 INJECTION, SOLUTION EPIDURAL; INFILTRATION; INTRACAUDAL; PERINEURAL AS NEEDED
Status: DISCONTINUED | OUTPATIENT
Start: 2018-10-12 | End: 2018-10-12 | Stop reason: HOSPADM

## 2018-10-12 RX ORDER — FENTANYL CITRATE 50 UG/ML
50 INJECTION, SOLUTION INTRAMUSCULAR; INTRAVENOUS AS NEEDED
Status: DISCONTINUED | OUTPATIENT
Start: 2018-10-12 | End: 2018-10-12 | Stop reason: HOSPADM

## 2018-10-12 RX ORDER — ALBUTEROL SULFATE 0.83 MG/ML
2.5 SOLUTION RESPIRATORY (INHALATION)
Status: DISCONTINUED | OUTPATIENT
Start: 2018-10-12 | End: 2018-10-13 | Stop reason: HOSPADM

## 2018-10-12 RX ORDER — SODIUM CHLORIDE, SODIUM LACTATE, POTASSIUM CHLORIDE, CALCIUM CHLORIDE 600; 310; 30; 20 MG/100ML; MG/100ML; MG/100ML; MG/100ML
125 INJECTION, SOLUTION INTRAVENOUS CONTINUOUS
Status: DISCONTINUED | OUTPATIENT
Start: 2018-10-12 | End: 2018-10-12 | Stop reason: HOSPADM

## 2018-10-12 RX ORDER — CEFAZOLIN SODIUM IN 0.9 % NACL 2 G/100 ML
PLASTIC BAG, INJECTION (ML) INTRAVENOUS AS NEEDED
Status: DISCONTINUED | OUTPATIENT
Start: 2018-10-12 | End: 2018-10-12 | Stop reason: HOSPADM

## 2018-10-12 RX ORDER — MYCOPHENOLATE MOFETIL 250 MG/1
750 CAPSULE ORAL 2 TIMES DAILY
Status: DISCONTINUED | OUTPATIENT
Start: 2018-10-12 | End: 2018-10-13 | Stop reason: HOSPADM

## 2018-10-12 RX ORDER — SODIUM CHLORIDE 9 MG/ML
25 INJECTION, SOLUTION INTRAVENOUS CONTINUOUS
Status: DISCONTINUED | OUTPATIENT
Start: 2018-10-12 | End: 2018-10-12 | Stop reason: HOSPADM

## 2018-10-12 RX ORDER — MIDAZOLAM HYDROCHLORIDE 1 MG/ML
1 INJECTION, SOLUTION INTRAMUSCULAR; INTRAVENOUS AS NEEDED
Status: DISCONTINUED | OUTPATIENT
Start: 2018-10-12 | End: 2018-10-12 | Stop reason: HOSPADM

## 2018-10-12 RX ORDER — SODIUM CHLORIDE 0.9 % (FLUSH) 0.9 %
5-10 SYRINGE (ML) INJECTION AS NEEDED
Status: DISCONTINUED | OUTPATIENT
Start: 2018-10-12 | End: 2018-10-12 | Stop reason: HOSPADM

## 2018-10-12 RX ORDER — MIDAZOLAM HYDROCHLORIDE 1 MG/ML
INJECTION, SOLUTION INTRAMUSCULAR; INTRAVENOUS AS NEEDED
Status: DISCONTINUED | OUTPATIENT
Start: 2018-10-12 | End: 2018-10-12 | Stop reason: HOSPADM

## 2018-10-12 RX ORDER — PREDNISONE 20 MG/1
20 TABLET ORAL DAILY
Status: DISCONTINUED | OUTPATIENT
Start: 2018-10-13 | End: 2018-10-13 | Stop reason: HOSPADM

## 2018-10-12 RX ORDER — FUROSEMIDE 40 MG/1
40 TABLET ORAL DAILY
Status: DISCONTINUED | OUTPATIENT
Start: 2018-10-13 | End: 2018-10-13 | Stop reason: HOSPADM

## 2018-10-12 RX ORDER — PHENYLEPHRINE HCL IN 0.9% NACL 0.4MG/10ML
SYRINGE (ML) INTRAVENOUS AS NEEDED
Status: DISCONTINUED | OUTPATIENT
Start: 2018-10-12 | End: 2018-10-12 | Stop reason: HOSPADM

## 2018-10-12 RX ORDER — SUCCINYLCHOLINE CHLORIDE 20 MG/ML
INJECTION INTRAMUSCULAR; INTRAVENOUS AS NEEDED
Status: DISCONTINUED | OUTPATIENT
Start: 2018-10-12 | End: 2018-10-12 | Stop reason: HOSPADM

## 2018-10-12 RX ORDER — SODIUM CHLORIDE, SODIUM LACTATE, POTASSIUM CHLORIDE, CALCIUM CHLORIDE 600; 310; 30; 20 MG/100ML; MG/100ML; MG/100ML; MG/100ML
25 INJECTION, SOLUTION INTRAVENOUS CONTINUOUS
Status: DISCONTINUED | OUTPATIENT
Start: 2018-10-12 | End: 2018-10-12 | Stop reason: HOSPADM

## 2018-10-12 RX ORDER — ONDANSETRON 2 MG/ML
4 INJECTION INTRAMUSCULAR; INTRAVENOUS AS NEEDED
Status: DISCONTINUED | OUTPATIENT
Start: 2018-10-12 | End: 2018-10-12 | Stop reason: HOSPADM

## 2018-10-12 RX ORDER — MORPHINE SULFATE 10 MG/ML
2 INJECTION, SOLUTION INTRAMUSCULAR; INTRAVENOUS
Status: DISCONTINUED | OUTPATIENT
Start: 2018-10-12 | End: 2018-10-12 | Stop reason: HOSPADM

## 2018-10-12 RX ORDER — DEXMEDETOMIDINE HYDROCHLORIDE 4 UG/ML
INJECTION, SOLUTION INTRAVENOUS AS NEEDED
Status: DISCONTINUED | OUTPATIENT
Start: 2018-10-12 | End: 2018-10-12 | Stop reason: HOSPADM

## 2018-10-12 RX ORDER — PANTOPRAZOLE SODIUM 40 MG/1
40 TABLET, DELAYED RELEASE ORAL
Status: DISCONTINUED | OUTPATIENT
Start: 2018-10-13 | End: 2018-10-13 | Stop reason: HOSPADM

## 2018-10-12 RX ORDER — FENTANYL CITRATE 50 UG/ML
25 INJECTION, SOLUTION INTRAMUSCULAR; INTRAVENOUS
Status: COMPLETED | OUTPATIENT
Start: 2018-10-12 | End: 2018-10-12

## 2018-10-12 RX ORDER — LIDOCAINE HYDROCHLORIDE AND EPINEPHRINE 10; 10 MG/ML; UG/ML
1.5 INJECTION, SOLUTION INFILTRATION; PERINEURAL ONCE
Status: COMPLETED | OUTPATIENT
Start: 2018-10-12 | End: 2018-10-12

## 2018-10-12 RX ORDER — SODIUM CHLORIDE 0.9 % (FLUSH) 0.9 %
5-10 SYRINGE (ML) INJECTION EVERY 8 HOURS
Status: DISCONTINUED | OUTPATIENT
Start: 2018-10-12 | End: 2018-10-13 | Stop reason: HOSPADM

## 2018-10-12 RX ORDER — ONDANSETRON 2 MG/ML
4 INJECTION INTRAMUSCULAR; INTRAVENOUS
Status: DISCONTINUED | OUTPATIENT
Start: 2018-10-12 | End: 2018-10-13 | Stop reason: HOSPADM

## 2018-10-12 RX ORDER — LEVOTHYROXINE SODIUM 125 UG/1
125 TABLET ORAL DAILY
Status: DISCONTINUED | OUTPATIENT
Start: 2018-10-13 | End: 2018-10-13 | Stop reason: HOSPADM

## 2018-10-12 RX ORDER — SODIUM CHLORIDE 0.9 % (FLUSH) 0.9 %
5-10 SYRINGE (ML) INJECTION EVERY 8 HOURS
Status: DISCONTINUED | OUTPATIENT
Start: 2018-10-12 | End: 2018-10-12 | Stop reason: HOSPADM

## 2018-10-12 RX ORDER — DEXAMETHASONE SODIUM PHOSPHATE 4 MG/ML
INJECTION, SOLUTION INTRA-ARTICULAR; INTRALESIONAL; INTRAMUSCULAR; INTRAVENOUS; SOFT TISSUE AS NEEDED
Status: DISCONTINUED | OUTPATIENT
Start: 2018-10-12 | End: 2018-10-12 | Stop reason: HOSPADM

## 2018-10-12 RX ORDER — MIDAZOLAM HYDROCHLORIDE 1 MG/ML
0.5 INJECTION, SOLUTION INTRAMUSCULAR; INTRAVENOUS
Status: DISCONTINUED | OUTPATIENT
Start: 2018-10-12 | End: 2018-10-12 | Stop reason: HOSPADM

## 2018-10-12 RX ORDER — TACROLIMUS 1 MG/1
1 CAPSULE ORAL EVERY 12 HOURS
Status: DISCONTINUED | OUTPATIENT
Start: 2018-10-12 | End: 2018-10-13 | Stop reason: HOSPADM

## 2018-10-12 RX ORDER — DEXTROSE, SODIUM CHLORIDE, AND POTASSIUM CHLORIDE 5; .45; .15 G/100ML; G/100ML; G/100ML
25 INJECTION INTRAVENOUS CONTINUOUS
Status: DISCONTINUED | OUTPATIENT
Start: 2018-10-12 | End: 2018-10-13 | Stop reason: HOSPADM

## 2018-10-12 RX ORDER — FERROUS SULFATE, DRIED 160(50) MG
2 TABLET, EXTENDED RELEASE ORAL EVERY 6 HOURS
Status: DISCONTINUED | OUTPATIENT
Start: 2018-10-12 | End: 2018-10-13 | Stop reason: HOSPADM

## 2018-10-12 RX ORDER — METOPROLOL SUCCINATE 25 MG/1
25 TABLET, EXTENDED RELEASE ORAL DAILY
Status: DISCONTINUED | OUTPATIENT
Start: 2018-10-13 | End: 2018-10-13 | Stop reason: HOSPADM

## 2018-10-12 RX ORDER — LIDOCAINE HYDROCHLORIDE 20 MG/ML
INJECTION, SOLUTION EPIDURAL; INFILTRATION; INTRACAUDAL; PERINEURAL AS NEEDED
Status: DISCONTINUED | OUTPATIENT
Start: 2018-10-12 | End: 2018-10-12 | Stop reason: HOSPADM

## 2018-10-12 RX ORDER — OXYCODONE HYDROCHLORIDE 5 MG/1
5 TABLET ORAL AS NEEDED
Status: DISCONTINUED | OUTPATIENT
Start: 2018-10-12 | End: 2018-10-12 | Stop reason: HOSPADM

## 2018-10-12 RX ORDER — SODIUM CHLORIDE 0.9 % (FLUSH) 0.9 %
5-10 SYRINGE (ML) INJECTION AS NEEDED
Status: DISCONTINUED | OUTPATIENT
Start: 2018-10-12 | End: 2018-10-13 | Stop reason: HOSPADM

## 2018-10-12 RX ORDER — ARFORMOTEROL TARTRATE 15 UG/2ML
15 SOLUTION RESPIRATORY (INHALATION)
Status: DISCONTINUED | OUTPATIENT
Start: 2018-10-12 | End: 2018-10-13 | Stop reason: HOSPADM

## 2018-10-12 RX ADMIN — Medication 80 MCG: at 12:43

## 2018-10-12 RX ADMIN — Medication 80 MCG: at 12:22

## 2018-10-12 RX ADMIN — OXYCODONE HYDROCHLORIDE AND ACETAMINOPHEN 1 TABLET: 10; 325 TABLET ORAL at 20:00

## 2018-10-12 RX ADMIN — FENTANYL CITRATE 25 MCG: 50 INJECTION, SOLUTION INTRAMUSCULAR; INTRAVENOUS at 13:20

## 2018-10-12 RX ADMIN — ROCURONIUM BROMIDE 5 MG: 10 INJECTION, SOLUTION INTRAVENOUS at 11:28

## 2018-10-12 RX ADMIN — FENTANYL CITRATE 50 MCG: 50 INJECTION, SOLUTION INTRAMUSCULAR; INTRAVENOUS at 11:55

## 2018-10-12 RX ADMIN — SODIUM CHLORIDE, SODIUM LACTATE, POTASSIUM CHLORIDE, CALCIUM CHLORIDE: 600; 310; 30; 20 INJECTION, SOLUTION INTRAVENOUS at 11:23

## 2018-10-12 RX ADMIN — TACROLIMUS 1 MG: 1 CAPSULE ORAL at 22:36

## 2018-10-12 RX ADMIN — DEXAMETHASONE SODIUM PHOSPHATE 4 MG: 4 INJECTION, SOLUTION INTRA-ARTICULAR; INTRALESIONAL; INTRAMUSCULAR; INTRAVENOUS; SOFT TISSUE at 11:37

## 2018-10-12 RX ADMIN — ACETAMINOPHEN 1000 MG: 10 INJECTION, SOLUTION INTRAVENOUS at 13:40

## 2018-10-12 RX ADMIN — MYCOPHENOLATE MOFETIL 750 MG: 250 CAPSULE ORAL at 22:36

## 2018-10-12 RX ADMIN — SUCCINYLCHOLINE CHLORIDE 140 MG: 20 INJECTION INTRAMUSCULAR; INTRAVENOUS at 11:28

## 2018-10-12 RX ADMIN — CALCIUM CARBONATE-VITAMIN D TAB 500 MG-200 UNIT 2 TABLET: 500-200 TAB at 23:30

## 2018-10-12 RX ADMIN — ONDANSETRON 4 MG: 2 INJECTION INTRAMUSCULAR; INTRAVENOUS at 12:51

## 2018-10-12 RX ADMIN — LIDOCAINE HYDROCHLORIDE 100 MG: 20 INJECTION, SOLUTION EPIDURAL; INFILTRATION; INTRACAUDAL; PERINEURAL at 11:28

## 2018-10-12 RX ADMIN — FENTANYL CITRATE 25 MCG: 50 INJECTION, SOLUTION INTRAMUSCULAR; INTRAVENOUS at 13:33

## 2018-10-12 RX ADMIN — OXYCODONE HYDROCHLORIDE 5 MG: 5 TABLET ORAL at 15:41

## 2018-10-12 RX ADMIN — Medication 120 MCG: at 12:47

## 2018-10-12 RX ADMIN — DEXMEDETOMIDINE HYDROCHLORIDE 4 MCG: 4 INJECTION, SOLUTION INTRAVENOUS at 11:57

## 2018-10-12 RX ADMIN — MIDAZOLAM HYDROCHLORIDE 2 MG: 1 INJECTION, SOLUTION INTRAMUSCULAR; INTRAVENOUS at 11:23

## 2018-10-12 RX ADMIN — FENTANYL CITRATE 50 MCG: 50 INJECTION, SOLUTION INTRAMUSCULAR; INTRAVENOUS at 11:28

## 2018-10-12 RX ADMIN — ALBUTEROL SULFATE 2.5 MG: 2.5 SOLUTION RESPIRATORY (INHALATION) at 22:46

## 2018-10-12 RX ADMIN — FENTANYL CITRATE 25 MCG: 50 INJECTION, SOLUTION INTRAMUSCULAR; INTRAVENOUS at 13:45

## 2018-10-12 RX ADMIN — Medication 40 MCG: at 12:31

## 2018-10-12 RX ADMIN — PROPOFOL 200 MG: 10 INJECTION, EMULSION INTRAVENOUS at 11:28

## 2018-10-12 RX ADMIN — Medication 120 MCG: at 12:52

## 2018-10-12 RX ADMIN — CALCIUM CARBONATE-VITAMIN D TAB 500 MG-200 UNIT 2 TABLET: 500-200 TAB at 20:00

## 2018-10-12 RX ADMIN — FENTANYL CITRATE 25 MCG: 50 INJECTION, SOLUTION INTRAMUSCULAR; INTRAVENOUS at 13:39

## 2018-10-12 RX ADMIN — Medication 2 G: at 11:35

## 2018-10-12 RX ADMIN — DEXTROSE, SODIUM CHLORIDE, AND POTASSIUM CHLORIDE 25 ML/HR: 5; .45; .15 INJECTION INTRAVENOUS at 21:37

## 2018-10-12 RX ADMIN — DEXMEDETOMIDINE HYDROCHLORIDE 6 MCG: 4 INJECTION, SOLUTION INTRAVENOUS at 11:55

## 2018-10-12 RX ADMIN — BUDESONIDE 500 MCG: 0.5 INHALANT RESPIRATORY (INHALATION) at 22:46

## 2018-10-12 RX ADMIN — Medication 80 MCG: at 12:37

## 2018-10-12 RX ADMIN — PROPOFOL 50 MG: 10 INJECTION, EMULSION INTRAVENOUS at 12:12

## 2018-10-12 NOTE — IP AVS SNAPSHOT
[address] [facility] Cassi Diaz 13 
[phone] Patient: Nano Almazan MRN: [id number] BARBARA:[birth year] About your hospitalization You were admitted on:  October 12, 2018 You last received care in the:  [address] [place] [address] You were discharged on:  October 13, 2018 Why you were hospitalized Your primary diagnosis was:  Copd (Chronic Obstructive Pulmonary Disease) (Hcc) Your diagnoses also included:  Multinodular Goiter, Dysphagia Follow-up Information Follow up With Details Comments Contact Info Keri Vieyra MD   [address] [place] [address] 
[phone] Halie Yung MD Schedule an appointment as soon as possible for a visit in 2 weeks  [facility] 
[facility] [address] [facility] 7 [id number] [phone] Discharge Orders None A check alexandre indicates which time of day the medication should be taken. My Medications START taking these medications Instructions Each Dose to Equal  
 Morning Noon Evening Bedtime  
 calcium-vitamin D 500 mg(1,250mg) -200 unit per tablet Commonly known as:  OYSTER SHELL Your last dose was: Your next dose is: Take 2 Tabs by mouth every six (6) hours for 14 days. We will start a taper at your post op visit May be form other than oyster shell but must be calcium carbonate 2 Tab  
    
   
   
   
  
 docusate sodium 50 mg capsule Commonly known as:  Rylie Ewingo Your last dose was: Your next dose is: Take 2 Caps by mouth two (2) times daily as needed for Constipation. 100 mg  
    
   
   
   
  
 levothyroxine 125 mcg tablet Commonly known as:  SYNTHROID Your last dose was: Your next dose is: Take 1 Tab by mouth Daily (before breakfast). 125 mcg  
    
   
   
   
  
 ondansetron 8 mg disintegrating tablet Commonly known as:  ZOFRAN ODT Your last dose was: Your next dose is: Take 0.5-1 Tabs by mouth every eight (8) hours as needed for Nausea. 4-8 mg  
    
   
   
   
  
 oxyCODONE-acetaminophen 5-325 mg per tablet Commonly known as:  PERCOCET Your last dose was: Your next dose is: Take 1 Tab by mouth every six (6) hours as needed for Pain for up to 7 days. Max Daily Amount: 4 Tabs. 1 Tab CONTINUE taking these medications Instructions Each Dose to Equal  
 Morning Noon Evening Bedtime ADVAIR DISKUS 250-50 mcg/dose diskus inhaler Generic drug:  fluticasone-salmeterol Your last dose was: Your next dose is: Take 1 Puff by inhalation every twelve (12) hours. 1 Puff  
    
   
   
   
  
 albuterol 90 mcg/actuation inhaler Commonly known as:  PROVENTIL HFA, VENTOLIN HFA, PROAIR HFA Your last dose was: Your next dose is: Take 2 Puffs by inhalation as needed. 2 Puff  
    
   
   
   
  
 atorvastatin 40 mg tablet Commonly known as:  LIPITOR Your last dose was: Your next dose is: Take 40 mg by mouth nightly. 40 mg  
    
   
   
   
  
 famotidine 20 mg tablet Commonly known as:  PEPCID Your last dose was: Your next dose is: Take 20 mg by mouth daily. 20 mg  
    
   
   
   
  
 fluticasone 50 mcg/actuation nasal spray Commonly known as:  Haley Qiuntanilla Your last dose was: Your next dose is: 2 Sprays by Both Nostrils route daily as needed. 2 Spray  
    
   
   
   
  
 furosemide 20 mg tablet Commonly known as:  LASIX Your last dose was: Your next dose is: Take 40 mg by mouth daily. 40 mg  
    
   
   
   
  
 metoprolol succinate 25 mg XL tablet Commonly known as:  TOPROL-XL Your last dose was: Your next dose is: Take  by mouth daily. mycophenolate 500 mg tablet Commonly known as:  CELLCEPT Your last dose was: Your next dose is: Take 750 mg by mouth two (2) times a day. 750 mg  
    
   
   
   
  
 omeprazole 40 mg capsule Commonly known as:  PRILOSEC Your last dose was: Your next dose is: Take 40 mg by mouth daily. 40 mg  
    
   
   
   
  
 predniSONE 20 mg tablet Commonly known as:  Abad Ma Your last dose was: Your next dose is: Take 20 mg by mouth daily. 20 mg  
    
   
   
   
  
 tacrolimus 1 mg capsule Commonly known as:  PROGRAF Your last dose was: Your next dose is: Take  by mouth every twelve (12) hours. Where to Get Your Medications Information on where to get these meds will be given to you by the nurse or doctor. ! Ask your nurse or doctor about these medications  
  calcium-vitamin D 500 mg(1,250mg) -200 unit per tablet  
 docusate sodium 50 mg capsule  
 levothyroxine 125 mcg tablet  
 ondansetron 8 mg disintegrating tablet  
 oxyCODONE-acetaminophen 5-325 mg per tablet Opioid Education Prescription Opioids: What You Need to Know: 
 
Prescription opioids can be used to help relieve moderate-to-severe pain and are often prescribed following a surgery or injury, or for certain health conditions. These medications can be an important part of treatment but also come with serious risks. Opioids are strong pain medicines. Examples include hydrocodone, oxycodone, fentanyl, and morphine. Heroin is an example of an illegal opioid. It is important to work with your health care provider to make sure you are getting the safest, most effective care. WHAT ARE THE RISKS AND SIDE EFFECTS OF OPIOID USE?  
Prescription opioids carry serious risks of addiction and overdose, especially with prolonged use. An opioid overdose, often marked by slow breathing, can cause sudden death. The use of prescription opioids can have a number of side effects as well, even when taken as directed. · Tolerance-meaning you might need to take more of a medication for the same pain relief · Physical dependence-meaning you have symptoms of withdrawal when the medication is stopped. Withdrawal symptoms can include nausea, sweating, chills, diarrhea, stomach cramps, and muscle aches. Withdrawal can last up to several weeks, depending on which drug you took and how long you took it. · Increased sensitivity to pain · Constipation · Nausea, vomiting, and dry mouth · Sleepiness and dizziness · Confusion · Depression · Low levels of testosterone that can result in lower sex drive, energy, and strength · Itching and sweating RISKS ARE GREATER WITH:      
· History of drug misuse, substance use disorder, or overdose · Mental health conditions (such as depression or anxiety) · Sleep apnea · Older age (72 years or older) · Pregnancy Avoid alcohol while taking prescription opioids. Also, unless specifically advised by your health care provider, medications to avoid include: · Benzodiazepines (such as Xanax or Valium) · Muscle relaxants (such as Soma or Flexeril) · Hypnotics (such as Ambien or Lunesta) · Other prescription opioids KNOW YOUR OPTIONS Talk to your health care provider about ways to manage your pain that don't involve prescription opioids. Some of these options may actually work better and have fewer risks and side effects. Consult your physician before adding or stopping any medications, treatments, or physical activity. Options may include: 
· Pain relievers such as acetaminophen, ibuprofen, and naproxen · Some medications that are also used for depression or seizures · Physical therapy and exercise · Counseling to help patients learn how to cope better with triggers of pain and stress. · Application of heat or cold compress · Massage therapy · Relaxation techniques Be Informed Make sure you know the name of your medication, how much and how often to take it, and its potential risks & side effects. IF YOU ARE PRESCRIBED OPIOIDS FOR PAIN: 
· Never take opioids in greater amounts or more often than prescribed. Remember the goal is not to be pain-free but to manage your pain at a tolerable level. · Follow up with your primary care provider to: · Work together to create a plan on how to manage your pain. · Talk about ways to help manage your pain that don't involve prescription opioids. · Talk about any and all concerns and side effects. · Help prevent misuse and abuse. · Never sell or share prescription opioids · Help prevent misuse and abuse. · Store prescription opioids in a secure place and out of reach of others (this may include visitors, children, friends, and family). · Safely dispose of unused/unwanted prescription opioids: Find your community drug take-back program or your pharmacy mail-back program, or flush them down the toilet, following guidance from the Food and Drug Administration (www.fda.gov/Drugs/ResourcesForYou). · Visit www.cdc.gov/drugoverdose to learn about the risks of opioid abuse and overdose. · If you believe you may be struggling with addiction, tell your health care provider and ask for guidance or call [address] [place], [place] [address]ngmoco at 1-134-205-FMIK. Discharge Instructions Post Thyroidectomy Instructions Follow up: with Dr. Paulino Lefort 2 weeks after surgery to have your steristrips removed. Shortly after surgery call [phone] to schedule this appointment. ? Eat regular foods. ? You may shower in 24 hours.   Do not allow direct water pressure on your wound.  If water trickles down while washing your hair, allow the wound to dry on its own. 
 
? Do not scrub or soak your wound for 2 weeks or 14 days. ? No strenuous activity: for 14 days. ? No moving more than 15 pounds: for 14 days. Then includes pulling, pushing, tugging, throwing. ? There is generally not a lot of pain: with this surgery. Take your pain medication as needed. Most patients, if they do have pain, will have neck stiffness/discomfort. You may have numbness or tingling surrounding the area of your wound. Narcotics can cause constipation; use your Colace if this is the case. ? Nausea and vomiting: from lingering effects of general anesthesia usually resolves by the following day. The narcotic pain medication can cause nausea and vomiting. They should be taken with food or fluids to minimize this. Medications that reduce nausea and vomiting can be prescribed by your physician. ? Fever above 100.4, redness around wound, pus drainage from wound: call your doctor. ? Bleeding: is uncommon (less than 1%). If it does occur your neck will develop a fullness. It is good to take a look at your neck shortly after surgery to see what a baseline appearance is. If there are changes to this, then call your provider. \ 
 
CALL or TEXT Dr. Barbara Diaz for questions or concerns - text works best - [phone] ? Special Instructions for if you had both sides of your thyroid removed or the remainder of your thyroid removed: 
 
o You may be on Calcium (Eleonora Pencil)  it is very important that you take this. Dr. Barbara Diaz will start a taper at your follow up visit 
o If you experience tingling in the hands or around your mouth, your calcium may be dropping, go to the emergency room immediately and tell them you had your thyroid removed. You will be started on a dose of thyroid hormone replacement if you did not have high levels of thyroid hormone prior to surgery. Take this every day. Introducing [facility]! [facility] introduces Zenitumt patient portal. Now you can access parts of your medical record, email your doctor's office, and request medication refills online. 1. In your internet browser, go to [email or web address] 2. Click on the First Time User? Click Here link in the Sign In box. You will see the New Member Sign Up page. 3. Enter your [employer] Access Code exactly as it appears below. You will not need to use this code after youve completed the sign-up process. If you do not sign up before the expiration date, you must request a new code. · [employer] Access Code: [email or web address] Expires: 12/3/2018  1:31 PM 
 
4. Enter the last four digits of your Social Security Number (xxxx) and Date of Birth (mm/dd/yyyy) as indicated and click Submit. You will be taken to the next sign-up page. 5. Create a [employer] ID. This will be your [employer] login ID and cannot be changed, so think of one that is secure and easy to remember. 6. Create a [employer] password. You can change your password at any time. 7. Enter your Password Reset Question and Answer. This can be used at a later time if you forget your password. 8. Enter your e-mail address. You will receive e-mail notification when new information is available in [address] 9. Click Sign Up. You can now view and download portions of your medical record. 10. Click the Download Summary menu link to download a portable copy of your medical information. If you have questions, please visit the Frequently Asked Questions section of the [employer] website. Remember, [employer] is NOT to be used for urgent needs. For medical emergencies, dial 911. Now available from your iPhone and Android! Introducing Dex Tony As a [facility] patient, I wanted to make you aware of our electronic visit tool called Dex Tony. [facility] 24/7 allows you to connect within minutes with a medical provider 24 hours a day, seven days a week via a mobile device or tablet or logging into a secure website from your computer. You can access Farecast from anywhere in the [place]. A virtual visit might be right for you when you have a simple condition and feel like you just dont want to get out of bed, or cant get away from work for an appointment, when your regular [employer] provider is not available (evenings, weekends or holidays), or when youre out of town and need minor care. Electronic visits cost only $49 and if the [employer] 24/7 provider determines a prescription is needed to treat your condition, one can be electronically transmitted to a nearby pharmacy*. Please take a moment to enroll today if you have not already done so. The enrollment process is free and takes just a few minutes. To enroll, please download the [employer] 24/7 ted to your tablet or phone, or visit [email or web address] to enroll on your computer. And, as an [address] [place] [address] patient with a [employer] account, the results of your visits will be scanned into your electronic medical record and your primary care provider will be able to view the scanned results. We urge you to continue to see your regular [employer] provider for your ongoing medical care. And while your primary care provider may not be the one available when you seek a Dex [employer] virtual visit, the peace of mind you get from getting a real diagnosis real time can be priceless. For more information on Farecast, view our Frequently Asked Questions (FAQs) at [email or web address]. Sincerely, 
 
Leonardo Morris MD 
Chief Medical Officer [address] *:  certain medications cannot be prescribed via Sirion[employer] Providers Seen During Your Hospitalization Provider Specialty Primary office phone Hiram Plascencia MD Otolaryngology [phone] Your Primary Care Physician (PCP) Primary Care Physician Office Phone Office Fax Brandon Loera [phone] You are allergic to the following No active allergies Recent Documentation Height Weight BMI OB Status Smoking Status 1.702 m 103.6 kg 35.77 kg/m2 Menopause Current Every Day Smoker Emergency Contacts Name Discharge Info Relation Home Work Mobile [facility] DISCHARGE CAREGIVER [3] Daughter [21] [phone] ShreyaFunmilayoantonio DISCHARGE CAREGIVER [3] Brother [24] [phone] Patient Belongings The following personal items are in your possession at time of discharge: 
  Dental Appliances: None (missing teeth)  Visual Aid: Glasses             Clothing:  (clothes in bag with purse) Please provide this summary of care documentation to your next provider. Signatures-by signing, you are acknowledging that this After Visit Summary has been reviewed with you and you have received a copy. Patient Signature:  ____________________________________________________________ Date:  ____________________________________________________________  
  
[facility] Provider Signature:  ____________________________________________________________ Date:  ____________________________________________________________

## 2018-10-12 NOTE — ANESTHESIA PREPROCEDURE EVALUATION
Anesthetic History   No history of anesthetic complications            Review of Systems / Medical History  Patient summary reviewed, nursing notes reviewed and pertinent labs reviewed    Pulmonary            Asthma : well controlled       Neuro/Psych   Within defined limits           Cardiovascular    Hypertension              Exercise tolerance: >4 METS     GI/Hepatic/Renal     GERD    Renal disease      Comments: Dysphagia  Renal tx Endo/Other        Morbid obesity     Other Findings   Comments: S/p Renal tx           Physical Exam    Airway  Mallampati: I  TM Distance: > 6 cm  Neck ROM: normal range of motion   Mouth opening: Normal     Cardiovascular    Rhythm: regular  Rate: normal         Dental         Pulmonary  Breath sounds clear to auscultation               Abdominal         Other Findings            Anesthetic Plan    ASA: 3  Anesthesia type: general          Induction: Intravenous  Anesthetic plan and risks discussed with: Patient

## 2018-10-12 NOTE — ANESTHESIA POSTPROCEDURE EVALUATION
Post-Anesthesia Evaluation and Assessment Patient: Meir Young MRN: [id number]  SSN: [id number] YOB: 1960  Age: 66 y.o. Sex: female Cardiovascular Function/Vital Signs Visit Vitals  /75  Pulse 67  Temp 36.7 °C (98 °F)  Resp 15  Ht 5' 7\" (1.702 m)  Wt 103.6 kg (228 lb 6.3 oz)  SpO2 96%  BMI 35.77 kg/m2 Patient is status post general anesthesia for Procedure(s): NECK EXPLORATION, TOTAL THYROIDECTOMY. Nausea/Vomiting: None Postoperative hydration reviewed and adequate. Pain: 
Pain Scale 1: Numeric (0 - 10) (10/12/18 1345) Pain Intensity 1: 7 (10/12/18 1345) Managed Neurological Status:  
Neuro (WDL): Within Defined Limits (10/12/18 1317) At baseline Mental Status and Level of Consciousness: Arousable Pulmonary Status:  
O2 Device: Nasal cannula (10/12/18 1345) Adequate oxygenation and airway patent Complications related to anesthesia: None Post-anesthesia assessment completed. No concerns Signed By: Mason Toledo MD   
 October 12, 2018

## 2018-10-12 NOTE — IP AVS SNAPSHOT
[address] [facility] [address] [place] [address] 
[phone] Patient: Elnita Blizzard MRN: [id number] BBV:[birth year] A check alexandre indicates which time of day the medication should be taken. My Medications START taking these medications Instructions Each Dose to Equal  
 Morning Noon Evening Bedtime  
 calcium-vitamin D 500 mg(1,250mg) -200 unit per tablet Commonly known as:  OYSTER SHELL Your last dose was: Your next dose is: Take 2 Tabs by mouth every six (6) hours for 14 days. We will start a taper at your post op visit May be form other than oyster shell but must be calcium carbonate 2 Tab  
    
   
   
   
  
 docusate sodium 50 mg capsule Commonly known as:  Karissa Vargas Your last dose was: Your next dose is: Take 2 Caps by mouth two (2) times daily as needed for Constipation. 100 mg  
    
   
   
   
  
 levothyroxine 125 mcg tablet Commonly known as:  SYNTHROID Your last dose was: Your next dose is: Take 1 Tab by mouth Daily (before breakfast). 125 mcg  
    
   
   
   
  
 ondansetron 8 mg disintegrating tablet Commonly known as:  ZOFRAN ODT Your last dose was: Your next dose is: Take 0.5-1 Tabs by mouth every eight (8) hours as needed for Nausea. 4-8 mg  
    
   
   
   
  
 oxyCODONE-acetaminophen 5-325 mg per tablet Commonly known as:  PERCOCET Your last dose was: Your next dose is: Take 1 Tab by mouth every six (6) hours as needed for Pain for up to 7 days. Max Daily Amount: 4 Tabs. 1 Tab CONTINUE taking these medications Instructions Each Dose to Equal  
 Morning Noon Evening Bedtime ADVAIR DISKUS 250-50 mcg/dose diskus inhaler Generic drug:  fluticasone-salmeterol Your last dose was: Your next dose is: Take 1 Puff by inhalation every twelve (12) hours. 1 Puff  
    
   
   
   
  
 albuterol 90 mcg/actuation inhaler Commonly known as:  PROVENTIL HFA, VENTOLIN HFA, PROAIR HFA Your last dose was: Your next dose is: Take 2 Puffs by inhalation as needed. 2 Puff  
    
   
   
   
  
 atorvastatin 40 mg tablet Commonly known as:  LIPITOR Your last dose was: Your next dose is: Take 40 mg by mouth nightly. 40 mg  
    
   
   
   
  
 famotidine 20 mg tablet Commonly known as:  PEPCID Your last dose was: Your next dose is: Take 20 mg by mouth daily. 20 mg  
    
   
   
   
  
 fluticasone 50 mcg/actuation nasal spray Commonly known as:  Jeppie Pilon Your last dose was: Your next dose is: 2 Sprays by Both Nostrils route daily as needed. 2 Spray  
    
   
   
   
  
 furosemide 20 mg tablet Commonly known as:  LASIX Your last dose was: Your next dose is: Take 40 mg by mouth daily. 40 mg  
    
   
   
   
  
 metoprolol succinate 25 mg XL tablet Commonly known as:  TOPROL-XL Your last dose was: Your next dose is: Take  by mouth daily. mycophenolate 500 mg tablet Commonly known as:  CELLCEPT Your last dose was: Your next dose is: Take 750 mg by mouth two (2) times a day. 750 mg  
    
   
   
   
  
 omeprazole 40 mg capsule Commonly known as:  PRILOSEC Your last dose was: Your next dose is: Take 40 mg by mouth daily. 40 mg  
    
   
   
   
  
 predniSONE 20 mg tablet Commonly known as:  Gabriela [place] Your last dose was: Your next dose is: Take 20 mg by mouth daily. 20 mg  
    
   
   
   
  
 tacrolimus 1 mg capsule Commonly known as:  PROGRAF Your last dose was: Your next dose is: Take  by mouth every twelve (12) hours. Where to Get Your Medications Information on where to get these meds will be given to you by the nurse or doctor. ! Ask your nurse or doctor about these medications  
  calcium-vitamin D 500 mg(1,250mg) -200 unit per tablet  
 docusate sodium 50 mg capsule  
 levothyroxine 125 mcg tablet  
 ondansetron 8 mg disintegrating tablet  
 oxyCODONE-acetaminophen 5-325 mg per tablet

## 2018-10-12 NOTE — PROGRESS NOTES
TRANSFER - OUT REPORT: 
 
Verbal report given to Tonya Hernández RN on Jaycob Garrido  being transferred to [facility] for routine post - op Report consisted of patients Situation, Background, Assessment and  
Recommendations(SBAR). Information from the following report(s) SBAR, OR Summary, Procedure Summary, Intake/Output and MAR was reviewed with the receiving nurse. Lines:  
Peripheral IV 10/12/18 Right Hand (Active) Site Assessment Clean, dry, & intact 10/12/2018  1:17 PM  
Phlebitis Assessment 0 10/12/2018  1:17 PM  
Infiltration Assessment 0 10/12/2018  1:17 PM  
Dressing Status Clean, dry, & intact 10/12/2018  1:17 PM  
Dressing Type Transparent;Tape 10/12/2018  1:17 PM  
Hub Color/Line Status Pink;Patent; Infusing 10/12/2018  1:17 PM  
Alcohol Cap Used Yes 10/12/2018  1:17 PM  
  
 
Opportunity for questions and clarification was provided. Patient transported with: 
 Registered Nurse

## 2018-10-12 NOTE — PROGRESS NOTES
TRANSFER - IN REPORT: 
 
Verbal report received from THEO Harry RN(name) on [employer]  being received from [facility](unit) for routine post - op Report consisted of patients Situation, Background, Assessment and  
Recommendations(SBAR). Information from the following report(s) SBAR, Kardex, Intake/Output, MAR and Recent Results was reviewed with the receiving nurse. Opportunity for questions and clarification was provided. Assessment completed upon patients arrival to unit and care assumed.

## 2018-10-12 NOTE — OP NOTES
Date of Procedure: 12 October 2018    Pre-operative Diagnosis: Multi-nodular goiter with dominant 1.8 cm left lower thyroid nodule; FNA consistent with benign hyperplastic, colloid nodule  Post-operative Diagnosis: Multi-nodular goiter with dominant 1.8 cm left lower thyroid nodule; FNA consistent with benign hyperplastic, colloid nodule  Procedure(s):   Neck exploration   Total Thyroidectomy  Laryngeal nerve monitoring  Surgeon(s) and Role:   * Mindy Vaughn MD  Co-surgeon  * James Arshad MD, [facility]  Co-surgeon   Anesthesia: General endotracheal anesthesia (GETA)  Urine output: Not documented  Estimated Blood Loss: 2 ml   IVF: 400 ml crystalloid             Drains: None  Patient in room at 1123 hours  Antibiotic prophylaxis: ANCEF 2 g at 1135 hours  Preliminary time out at 1202 hours  Beta blocker taken the morning of surgery prior to operation  Prayer at 1206 hours  Time out for surgery at 1206 hours    (During the time out for surgery the correct patient, operative site and procedure were confirmed, along with having the necessary equipment on hand to perform the operation safely)  Start of surgery at 1207 hours   End of surgery at 1300 hours   VTE prophylaxis with bilateral lower extremity compression devices   Pressure points padded   Sponge, sharp and instrument count: Correct  History: 61-year-old euthyroid female with multi-nodular goiter and dominant left lower thyroid nodule  6/28/2018  EXAM:  US THYROID/PARATHYROID/SOFT TISS   INDICATION: Thyroid adenoma. COMPARISON: None. TECHNIQUE: Real-time sonography of the thyroid gland was performed with a high  frequency linear transducer. Multiple static images were obtained. FINDINGS:  Thyroid gland is slightly heterogeneous in echotexture. There are bilateral  thyroid nodules. In the right thyroid there is a 0.9 x 0.8 x 0.6 cm nodule in the lower pole. In the left thyroid there is a 1.1 x 0.9 x 1 cm nodule in the mid gland.   In the left lower pole/isthmus, there is suspicion of a 1.8 x 1.6 cm echogenic  nodule. The right lobe measures 5.1 x 1.9 x 2.1 cm and the left lobe measures 4.7 x 1.9  x 2.1 cm. The isthmus measures 5 mm. IMPRESSION: There is suspicion of an echogenic nodule in the left lower  pole/isthmus measuring 1.8 x 1.6.   2 other nodules are small in size      TSH 0.52 uIU/ml  No history of head or neck radiation. No personal or family history of thyroid cancer. 8/2/2018 FNA of lower left thyroid nodule consistent with benign hyperplastic, colloid nodule    Procedure (s) performed:   Neck exploration   Total Thyroidectomy  Laryngeal nerve monitoring  Specimens: Total Thyroidectomy  single short stitch right superior thyroid pole; double stitch anterior isthmus  Findings:    Symmetrically enlarged thyroid with multiple palpable nodules   Left thyroid lobe with two nodules ranging in size from 1.1 to 1.8 cm, all encapsulated, without suspicious gross manifestations   Right thyroid lobe has a 1 cm, encapsulated nodule in the right lower lobe   The right lobe measures 5 x 2 cm   The left lobe measures 4.5 x 2 cm   The isthmus measures 5 mm   No palpable central or lateral neck adenopathy.  Two normal appearing parathyroid glands (right superior and right inferior) identified and preserved on a vascular pedicle.  Two normal appearing parathyroid glands (left superior and left inferior) identified and preserved on a vascular pedicle.     Both recurrent laryngeal nerves were identified and carefully preserved throughout the entire course of the operation    Both recurrent laryngeal nerves were confirmed to be structurally and functionally intact - audible signal attained with nerve stimulator / NIM system when applied to both the right and left recurrent laryngeal nerve    Excellent hemostasis confirmed at end of operation    Pre-op holding area:   Patient seen in pre-operative holding area   Operative site marked on anterior neck  Patient was informed of the indications, nature, risks, benefits, alternatives and expected outcomes of the proposed operation:   Neck Exploration, Laryngeal Neuromonitoring, Total Thyroidectomy, possible central neck dissection  The patient voiced understanding of the aforesaid and provided informed consent to proceed   The patient asked pertinent questions, all of which were answered to her apparent satisfaction   Operative Procedure: The patient was escorted to the operating room   Upon arrival to the operative room, the patient, procedure, and operative site were confirmed via a pre-operative time-out. During the time out for surgery the correct patient, operative site and procedure were confirmed, along with having the necessary equipment on hand to perform the operation safely. All in attendance were in agreement. The patient was placed in the supine position and general anesthesia induced without incident. General endotracheal anesthesia was induced with endotracheal placement of NIM tube in order to facilitate laryngeal neuro-monitoring. The eyes were taped shut. Prophylactic antibiotic (ANCEF 2g) was administered prior to the procedure. Beta blockade was taken by the patient the morning of surgery prior to operation   With the patient in the supine position the arms were tucked, a pillow was placed behind the knees and the heels padded; the neck was slightly extended, and table elevated to 30 degrees. A roll was placed behind the scapulae to create mild degree of neck flexion  The occiput was placed on a cushion. Subcutaneous NIM monitoring system leads were placed. Pressure ulcer prophylaxis was in effect with pressure point padding.    VTE prophylaxis was also in effect with lower extremity mechanical compression devices   Sterile anterior neck/sternum prep (Chlorhexidine - alcohol) and drape   We prayed for our patient and we repeated the TIME OUT prior to commencing the operation to confirm the    correct patient,    correct operative site,    correct operative procedure and    necessary equipment on hand to conduct the operation safely. Local anesthesia (50:50 mix of 1% LIDO w/ EPI  3 ml) infiltrated subcutaneously at site of proposed anterior cervical skin incision   Low anterior, transverse cervical incision was made, 6 cm in length, two finger breadths superior to the sternoclavicular joints, and parallel to the normal skin lines of the neck. The skin, subcutaneous tissue and platysma muscle were divided   Sub-platysmal flaps were created by dissecting in the avascular plane superiorly to the thyroid cartilage and inferiorly to the suprasternal notch. Strap musculature (sternohyoid muscle and sternothyroid muscle) was  in the avascular midline (by incising the cervical linea alba), the incision extending from the suprasternal notch to the thyroid cartilage. The anterior jugular veins were ligated and divided. The anterior jugular veins empty into the jugular arch low in the neck anterior to the strap musculature. The jugular arch was ligated and divided in the space of Burns. A plane of dissection was developed between the posterior surface of the strap musculature (sternothyroid muscle) and the anterior surface of the thyroid gland. The right lateral thyroid recess was developed using gentle blunt dissection. The right thyroid lobe was gently lifted anteriorly. The right middle thyroid vein was isolated (no non-recurrent laryngeal nerve was observed). The right middle thyroid vein was controlled, ligated and divided with Harmonic scalpel on the right thyroid capsule. We then focused our attention on the right superior thyroid pole  The space was developed between the right cricothyroid muscle and medial aspect of right superior thyroid pole.    Recognizing that variations exist between the course of the superior laryngeal nerves and the first branch of the external carotid artery (the superior thyroid artery), we took great care to dissect the superior thyroid artery and its branches prior to ligation. We were careful to keep the external branch of the superior laryngeal nerve (EBSLN, which provides motor fibers to the cricothyroid muscle and the inferior pharyngeal constrictor muscle) out of harms way. The right thyroid lobe was retracted in a caudal and lateral direction. The right superior pole vessels (right superior thyroid artery and right superior thyroid vein) were skeletonized and meticulously controlled individually (with Harmonic scalpel division) on the thyroid capsule in order to avoid injury to the right superior laryngeal nerve's external branch. The right recurrent laryngeal nerve, which originates from the vagus nerve, loops around the right subclavian artery and ascends in the neck between the trachea and esophagus. The right recurrent laryngeal nerve was palpable and visually identified in the right trachea-esophageal (T-E) groove in proximity to the right inferior thyroid artery and right inferior thyroid vein. The right recurrent laryngeal nerves terminal branches provide motor innervation to all intrinsic muscles of the larynx except the cricothyroid muscle  innervated by EBSLN. The branches of the right inferior thyroid artery and branches of the right inferior thyroid vein were carefully dissected while maintaining the encountered right recurrent laryngeal nerve in view and out of harms way. The right inferior thyroid artery sends branches to both the superior and inferior parathyroid glands. The superior parathyroid gland (usually encountered at the Ligament of Berry lateral to the recurrent nerve) is more posterior in position relative to the more anteriorly situated inferior parathyroid gland (usually encountered anterior to the recurrent nerve, inferior to where the nerve crosses the inferior thyroid artery).   The right inferior thyroid artery branches were controlled on the thyroid capsule after its identified parathyroid branches were given off. Branches of the right inferior thyroid vein were controlled on the thyroid capsule with Harmonic scalpel in order to avoid injury to the right inferior parathyroid gland that was identified anterior and medial to the right recurrent laryngeal nerve. Both the inferior parathyroid gland and right recurrent laryngeal nerve were meticulously preserved intact and maintained out of harms way  The right inferior parathyroid gland was normal in size and gross appearance, and well perfused with its vasculature carefully preserved intact. Diligent search for the right superior parathyroid gland superior to the middle thyroid vein and posterior and lateral to the right recurrent laryngeal nerve identified a normal appearing, well perfused right superior parathyroid gland, which was also carefully maintained out of harms way. The right superior parathyroid gland was carefully preserved on its vascular pedicle. The right tubercle of Zukerkandl of the thyroid was identified and gently dissected free of both identified branches of the right recurrent laryngeal nerve. We recognize that this particular area where the right recurrent laryngeal nerve most closely approximates the thyroid gland and the terminal ascending branches of the inferior thyroid artery is where the recurrent nerve is at greatest risk of injury. The terminal branches of the right recurrent nerve may be non-recurrent (rarely), or may be anterior, posterior or in between the branches of the ascending right inferior thyroid artery.    The Ligament of Garcia Mokaden was identified at the posterolateral portion of the thyroid lobe just caudal to the cricoid cartilage, and it was gently dissected in order to identify its relationship to the terminal aspects of the right recurrent laryngeal nerve, which was found to be passing posterior to the Ligament of Vanessa to gain entry into the larynx. The terminal branches of the right inferior thyroid artery were meticulously controlled with bi-polar cautery taking care to spare the adjacent right recurrent laryngeal nerve. Both terminal right recurrent laryngeal nerve branches were identified and meticulously preserved, as they were seen passing under the inferior constrictor muscle, and through the cricothyroid membrane near the right cricothyroid joint, just superior to the cricoid cartilage, to enter the posterior and medial aspect of the larynx. The right thyroid lobe was further mobilized with care to the left. The attachments of the posterior right thyroid lobe and isthmus to the pre-tracheal fascia were divided with precision taking great care to avoid injury to the terminal branches of the right recurrent laryngeal nerve, which takes a more oblique course when compared to the more cephalo-caudad orientation of the recurrent laryngeal nerve on the left side of the neck, which also courses within the TE groove. The structural and functional integrity of the right recurrent laryngeal nerve was confirmed with the nerve stimulator ([employer] system), as a prominent, audible signal was attained with stimulation of the right recurrent nerve in its proximal, mid and distal cervical portions. Excellent hemostasis was confirmed along with normal superior, and inferior viable appearing/well perfused right parathyroid glands. Attention was then directed to the left neck. The same surgical principles and maneuvers were applied to the contralateral neck to complete the total thyroidectomy, briefly:  The left lateral thyroid recess was developed posterior to the left sternothyroid muscle utilizing gentle blunt dissection. The left middle thyroid was identified, dissected, ligated and divided with the Harmonic scalpel.    The space between the left cricothyroid muscle and medial aspect of the left superior thyroid pole was developed using gentle dissection. The left superior thyroid pole vessels (left superior thyroid artery and left superior thyroid vein) were meticulously controlled on the thyroid capsule with Harmonic scalpel ligation and division, taking great care to avoid injury to the left superior laryngeal nerve's external branch. The left thyroid lobe was gently mobilized anteromedially to the right. The left T-E groove was gently dissected. The left recurrent laryngeal nerve was encountered in proximity to the left inferior thyroid artery. The branches of the left inferior thyroid artery and left inferior thyroid vein were carefully controlled on the thyroid capsule in order to avoid injury and maintain blood supply to the identified, normal appearing left superior and inferior parathyroid glands. The well perfused, normal appearing left superior parathyroid gland was situated cephalad of the left middle thyroid vein, and posterior and lateral to the encountered left recurrent laryngeal nerve. Dissection anterior and medial to the left recurrent laryngeal nerve and near the left inferior thyroid pole identified a well perfused, normal appearing left inferior parathyroid gland. Both parathyroid glands appeared viable/well perfused, and were carefully preserved on their respective vascular pedicles, intact throughout the entire course of our dissection. The superior and medial aspect of the left thyroid lobe was dissected free of both identified branches of the left recurrent laryngeal nerve. Both branches of the left recurrent laryngeal nerve were seen terminating near the left cricothyroid joint, and both recurrent laryngeal nerve branches were carefully preserved. The Tubercle of Zukerkandl of the left thyroid lobe was identified and gently dissected free of both identified branches of the left recurrent laryngeal nerve.    The attachments of the posterior left thyroid lobe to the trachea were divided taking care not injure the clearly visualized terminal branches of the left recurrent laryngeal nerve. The total thyroidectomy specimen was delivered, oriented for pathological processing (single stitch right upper thyroid pole, double stitch anterior isthmus) and submitted for permanent pathology analysis. The structural and functional integrity of the left recurrent laryngeal nerve, and the right recurrent laryngeal nerve was confirmed with the nerve stimulator ([employer]), as a prominent audible signal was attained when both branches of the left and right recurrent laryngeal nerves were stimulated. A diligent search of the central neck and both lateral areas of the neck was unrevealing; specifically, there was no palpable adenopathy in either the right or left lateral (Level II, III, IV) neck, or the central compartment (Level VI and VII). Excellent hemostasis was confirmed along with two viable appearing left parathyroid glands, and two viable appearing right parathyroid glands. Excellent hemostasis in the operative field was re-confirmed under Valsalva maneuver. Surgicel was placed in the operative field as a hemostatic adjunct. The strap musculature (sternothyroid and sternohyoid muscles) was re-approximated in the midline with running 3-0 Vicryl suture. A small space was left inferiorly between the strap muscles in the case of post-operative bleeding. The platysma muscle was reconstituted with running absorbable (3-0 Vicryl) suture   The wound was irrigated with saline solution. The skin incision was closed with running absorbable subcuticular suture (4-0 Monocryl). This was an uncomplicated operation. The patient was extubated uneventfully in the operating room and transferred to the PACU in stable condition with aspiration precautions in effect   Complications: None   Implants: None  Disposition:  To PACU extubated and in stable condition with aspiration precautions in effect   Valerie Dumas MD [facility]   Karla Campuzano MD

## 2018-10-12 NOTE — PERIOP NOTES
Patient: Claude Levy MRN: [id number]  SSN: [id number] YOB: 1960  Age: 66 y.o. Sex: female Patient is status post Procedure(s): NECK EXPLORATION, TOTAL THYROIDECTOMY. Surgeon(s) and Role: Annabelle Braga MD - Primary * Angus Manley MD 
 
Local/Dose/Irrigation: see [facility] Peripheral IV 10/12/18 Right Hand (Active) Site Assessment Clean, dry, & intact 10/12/2018 11:13 AM  
Phlebitis Assessment 0 10/12/2018 11:13 AM  
Infiltration Assessment 0 10/12/2018 11:13 AM  
Dressing Status Clean, dry, & intact 10/12/2018 11:13 AM  
                 
 
 
 
Dressing/Packing:  Wound Neck Mid;Lower-DRESSING TYPE: Adhesive wound closure strips (Steri-Strips); Adhesive wound dressing (Mastisol) (10/12/18 1100) Splint/Cast:  ] Other:

## 2018-10-12 NOTE — PROGRESS NOTES
Bedside and Verbal shift change report given to [address]  (oncoming nurse) by Glenny Franco RN  (offgoing nurse). Report included the following information SBAR, Kardex, OR Summary, Procedure Summary, Intake/Output, MAR and Recent Results.

## 2018-10-12 NOTE — BRIEF OP NOTE
BRIEF OPERATIVE NOTE Date of Procedure: 12 October 2018 Pre-operative Diagnosis: Multi-nodular goiter with dominant 1.8 cm left lower thyroid nodule; FNA consistent with benign hyperplastic, colloid nodule Post-operative Diagnosis: Multi-nodular goiter with dominant 1.8 cm left lower thyroid nodule; FNA consistent with benign hyperplastic, colloid nodule Procedure(s):  
Neck exploration Total Thyroidectomy Laryngeal nerve monitoring Surgeon(s) and Role: Richard Helm MD  Co-surgeon * Brain Alba MD, SHAY FACS  Co-surgeon Anesthesia: General endotracheal anesthesia (GETA) Urine output: Not documented Estimated Blood Loss: 2 ml IVF: 400 ml crystalloid Drains: None Patient in room at 1123 hours Antibiotic prophylaxis: ANCEF 2 g at 1135 hours Preliminary time out at 1202 hours Beta blocker taken the morning of surgery prior to operation Prayer at 1206 hours Time out for surgery at 1206 hours   
(During the time out for surgery the correct patient, operative site and procedure were confirmed, along with having the necessary equipment on hand to perform the operation safely) Start of surgery at 1207 hours End of surgery at 1300 hours VTE prophylaxis with bilateral lower extremity compression devices Pressure points padded Sponge, sharp and instrument count: Correct History: 45-year-old euthyroid female with multi-nodular goiter and dominant left lower thyroid nodule 6/28/2018 EXAM:  US THYROID/PARATHYROID/SOFT TISS INDICATION: Thyroid adenoma. COMPARISON: None. TECHNIQUE: Real-time sonography of the thyroid gland was performed with a high 
frequency linear transducer. Multiple static images were obtained. FINDINGS: 
Thyroid gland is slightly heterogeneous in echotexture. There are bilateral 
thyroid nodules. In the right thyroid there is a 0.9 x 0.8 x 0.6 cm nodule in the lower pole. In the left thyroid there is a 1.1 x 0.9 x 1 cm nodule in the mid gland. In the left lower pole/isthmus, there is suspicion of a 1.8 x 1.6 cm echogenic 
nodule. The right lobe measures 5.1 x 1.9 x 2.1 cm and the left lobe measures 4.7 x 1.9 
x 2.1 cm. The isthmus measures 5 mm. IMPRESSION: There is suspicion of an echogenic nodule in the left lower 
pole/isthmus measuring 1.8 x 1.6.  
2 other nodules are small in size TSH 0.52 uIU/ml No history of head or neck radiation. No personal or family history of thyroid cancer. 8/2/2018 FNA of lower left thyroid nodule consistent with benign hyperplastic, colloid nodule Procedure (s) performed:  
Neck exploration Total Thyroidectomy Laryngeal nerve monitoring Specimens: Total Thyroidectomy  single short stitch right superior thyroid pole; double stitch anterior isthmus Findings: ? Symmetrically enlarged thyroid with multiple palpable nodules ? Left thyroid lobe with two nodules ranging in size from 1.1 to 1.8 cm, all encapsulated, without suspicious gross manifestations ? Right thyroid lobe has a 1 cm, encapsulated nodule in the right lower lobe ? The right lobe measures 5 x 2 cm ? The left lobe measures 4.5 x 2 cm ? The isthmus measures 5 mm ? No palpable central or lateral neck adenopathy. ? Two normal appearing parathyroid glands (right superior and right inferior) identified and preserved on a vascular pedicle. ? Two normal appearing parathyroid glands (left superior and left inferior) identified and preserved on a vascular pedicle. ? Both recurrent laryngeal nerves were identified and carefully preserved throughout the entire course of the operation ? Both recurrent laryngeal nerves were confirmed to be structurally and functionally intact - audible signal attained with nerve stimulator / NIM system when applied to both the right and left recurrent laryngeal nerve ? Excellent hemostasis confirmed at end of operation Surgical Staff: 
Circ-1: Antonio Joyner RN Registered Nurse Assistant: Vicenta Machado RN Scrub RN-1: Ora Clifford RN Event Time In Incision Start 91 21 06 Incision Close 1258 Specimens:  
ID Type Source Tests Collected by Time Destination 1 : TOTAL THYROIDECTOMY Fresh Thyroid  Hiram Plascencia MD 10/12/2018 1139 Pathology Operative Procedure: The patient was escorted to the operating room Upon arrival to the operative room, the patient, procedure, and operative site were confirmed via a pre-operative time-out. During the time out for surgery the correct patient, operative site and procedure were confirmed, along with having the necessary equipment on hand to perform the operation safely. All in attendance were in agreement. The patient was placed in the supine position and general anesthesia induced without incident. General endotracheal anesthesia was induced with endotracheal placement of NIM tube in order to facilitate laryngeal neuro-monitoring. The eyes were taped shut. Prophylactic antibiotic (ANCEF 2g) was administered prior to the procedure. Beta blockade was taken by the patient the morning of surgery prior to operation With the patient in the supine position the arms were tucked, a pillow was placed behind the knees and the heels padded; the neck was slightly extended, and table elevated to 30 degrees. A roll was placed behind the scapulae to create mild degree of neck flexion The occiput was placed on a cushion. Subcutaneous NIM monitoring system leads were placed. Pressure ulcer prophylaxis was in effect with pressure point padding. VTE prophylaxis was also in effect with lower extremity mechanical compression devices Sterile anterior neck/sternum prep (Chlorhexidine - alcohol) and drape We prayed for our patient and we repeated the TIME OUT prior to commencing the operation to confirm the  
? correct patient,  
? correct operative site,  
 ? correct operative procedure and  
? necessary equipment on hand to conduct the operation safely. Local anesthesia (50:50 mix of 1% LIDO w/ EPI  3 ml) infiltrated subcutaneously at site of proposed anterior cervical skin incision Low anterior, transverse cervical incision was made, 6 cm in length, two finger breadths superior to the sternoclavicular joints, and parallel to the normal skin lines of the neck. The skin, subcutaneous tissue and platysma muscle were divided Sub-platysmal flaps were created by dissecting in the avascular plane superiorly to the thyroid cartilage and inferiorly to the suprasternal notch. Strap musculature (sternohyoid muscle and sternothyroid muscle) was  in the avascular midline (by incising the cervical linea alba), the incision extending from the suprasternal notch to the thyroid cartilage. The anterior jugular veins were ligated and divided. The anterior jugular veins empty into the jugular arch low in the neck anterior to the strap musculature. The jugular arch was ligated and divided in the space of Burns. A plane of dissection was developed between the posterior surface of the strap musculature (sternothyroid muscle) and the anterior surface of the thyroid gland. The right lateral thyroid recess was developed using gentle blunt dissection. The right thyroid lobe was gently lifted anteriorly. The right middle thyroid vein was isolated (no non-recurrent laryngeal nerve was observed). The right middle thyroid vein was controlled, ligated and divided with Harmonic scalpel on the right thyroid capsule. We then focused our attention on the right superior thyroid pole The space was developed between the right cricothyroid muscle and medial aspect of right superior thyroid pole.   
Recognizing that variations exist between the course of the superior laryngeal nerves and the first branch of the external carotid artery (the superior thyroid artery), we took great care to dissect the superior thyroid artery and its branches prior to ligation. We were careful to keep the external branch of the superior laryngeal nerve (EBSLN, which provides motor fibers to the cricothyroid muscle and the inferior pharyngeal constrictor muscle) out of harms way. The right thyroid lobe was retracted in a caudal and lateral direction. The right superior pole vessels (right superior thyroid artery and right superior thyroid vein) were skeletonized and meticulously controlled individually (with Harmonic scalpel division) on the thyroid capsule in order to avoid injury to the right superior laryngeal nerve's external branch. The right recurrent laryngeal nerve, which originates from the vagus nerve, loops around the right subclavian artery and ascends in the neck between the trachea and esophagus. The right recurrent laryngeal nerve was palpable and visually identified in the right trachea-esophageal (T-E) groove in proximity to the right inferior thyroid artery and right inferior thyroid vein. The right recurrent laryngeal nerves terminal branches provide motor innervation to all intrinsic muscles of the larynx except the cricothyroid muscle  innervated by EBSLN. The branches of the right inferior thyroid artery and branches of the right inferior thyroid vein were carefully dissected while maintaining the encountered right recurrent laryngeal nerve in view and out of harms way. The right inferior thyroid artery sends branches to both the superior and inferior parathyroid glands. The superior parathyroid gland (usually encountered at the Ligament of Berry lateral to the recurrent nerve) is more posterior in position relative to the more anteriorly situated inferior parathyroid gland (usually encountered anterior to the recurrent nerve, inferior to where the nerve crosses the inferior thyroid artery). The right inferior thyroid artery branches were controlled on the thyroid capsule after its identified parathyroid branches were given off. Branches of the right inferior thyroid vein were controlled on the thyroid capsule with Harmonic scalpel in order to avoid injury to the right inferior parathyroid gland that was identified anterior and medial to the right recurrent laryngeal nerve. Both the inferior parathyroid gland and right recurrent laryngeal nerve were meticulously preserved intact and maintained out of harms way The right inferior parathyroid gland was normal in size and gross appearance, and well perfused with its vasculature carefully preserved intact. Diligent search for the right superior parathyroid gland superior to the middle thyroid vein and posterior and lateral to the right recurrent laryngeal nerve identified a normal appearing, well perfused right superior parathyroid gland, which was also carefully maintained out of harms way. The right superior parathyroid gland was carefully preserved on its vascular pedicle. The right tubercle of Zukerkandl of the thyroid was identified and gently dissected free of both identified branches of the right recurrent laryngeal nerve. We recognize that this particular area where the right recurrent laryngeal nerve most closely approximates the thyroid gland and the terminal ascending branches of the inferior thyroid artery is where the recurrent nerve is at greatest risk of injury. The terminal branches of the right recurrent nerve may be non-recurrent (rarely), or may be anterior, posterior or in between the branches of the ascending right inferior thyroid artery.   
The Ligament of [place] Brunilda was identified at the posterolateral portion of the thyroid lobe just caudal to the cricoid cartilage, and it was gently dissected in order to identify its relationship to the terminal aspects of the right recurrent laryngeal nerve, which was found to be passing posterior to the Ligament of Vanessa to gain entry into the larynx. The terminal branches of the right inferior thyroid artery were meticulously controlled with bi-polar cautery taking care to spare the adjacent right recurrent laryngeal nerve. Both terminal right recurrent laryngeal nerve branches were identified and meticulously preserved, as they were seen passing under the inferior constrictor muscle, and through the cricothyroid membrane near the right cricothyroid joint, just superior to the cricoid cartilage, to enter the posterior and medial aspect of the larynx. The right thyroid lobe was further mobilized with care to the left. The attachments of the posterior right thyroid lobe and isthmus to the pre-tracheal fascia were divided with precision taking great care to avoid injury to the terminal branches of the right recurrent laryngeal nerve, which takes a more oblique course when compared to the more cephalo-caudad orientation of the recurrent laryngeal nerve on the left side of the neck, which also courses within the TE groove. The structural and functional integrity of the right recurrent laryngeal nerve was confirmed with the nerve stimulator (Koalify system), as a prominent, audible signal was attained with stimulation of the right recurrent nerve in its proximal, mid and distal cervical portions. Excellent hemostasis was confirmed along with normal superior, and inferior viable appearing/well perfused right parathyroid glands. Attention was then directed to the left neck. The same surgical principles and maneuvers were applied to the contralateral neck to complete the total thyroidectomy, briefly: 
The left lateral thyroid recess was developed posterior to the left sternothyroid muscle utilizing gentle blunt dissection. The left middle thyroid was identified, dissected, ligated and divided with the Harmonic scalpel.   
The space between the left cricothyroid muscle and medial aspect of the left superior thyroid pole was developed using gentle dissection. The left superior thyroid pole vessels (left superior thyroid artery and left superior thyroid vein) were meticulously controlled on the thyroid capsule with Harmonic scalpel ligation and division, taking great care to avoid injury to the left superior laryngeal nerve's external branch. The left thyroid lobe was gently mobilized anteromedially to the right. The left T-E groove was gently dissected. The left recurrent laryngeal nerve was encountered in proximity to the left inferior thyroid artery. The branches of the left inferior thyroid artery and left inferior thyroid vein were carefully controlled on the thyroid capsule in order to avoid injury and maintain blood supply to the identified, normal appearing left superior and inferior parathyroid glands. The well perfused, normal appearing left superior parathyroid gland was situated cephalad of the left middle thyroid vein, and posterior and lateral to the encountered left recurrent laryngeal nerve. Dissection anterior and medial to the left recurrent laryngeal nerve and near the left inferior thyroid pole identified a well perfused, normal appearing left inferior parathyroid gland. Both parathyroid glands appeared viable/well perfused, and were carefully preserved on their respective vascular pedicles, intact throughout the entire course of our dissection. The superior and medial aspect of the left thyroid lobe was dissected free of both identified branches of the left recurrent laryngeal nerve. Both branches of the left recurrent laryngeal nerve were seen terminating near the left cricothyroid joint, and both recurrent laryngeal nerve branches were carefully preserved. The Tubercle of Zukerkandl of the left thyroid lobe was identified and gently dissected free of both identified branches of the left recurrent laryngeal nerve. The attachments of the posterior left thyroid lobe to the trachea were divided taking care not injure the clearly visualized terminal branches of the left recurrent laryngeal nerve. The total thyroidectomy specimen was delivered, oriented for pathological processing (single stitch right upper thyroid pole, double stitch anterior isthmus) and submitted for permanent pathology analysis. The structural and functional integrity of the left recurrent laryngeal nerve, and the right recurrent laryngeal nerve was confirmed with the nerve stimulator (Intellikine System), as a prominent audible signal was attained when both branches of the left and right recurrent laryngeal nerves were stimulated. A diligent search of the central neck and both lateral areas of the neck was unrevealing; specifically, there was no palpable adenopathy in either the right or left lateral (Level II, III, IV) neck, or the central compartment (Level VI and VII). Excellent hemostasis was confirmed along with two viable appearing left parathyroid glands, and two viable appearing right parathyroid glands. Excellent hemostasis in the operative field was re-confirmed under Valsalva maneuver. Surgicel was placed in the operative field as a hemostatic adjunct. The strap musculature (sternothyroid and sternohyoid muscles) was re-approximated in the midline with running 3-0 Vicryl suture. A small space was left inferiorly between the strap muscles in the case of post-operative bleeding. The platysma muscle was reconstituted with running absorbable (3-0 Vicryl) suture The wound was irrigated with saline solution. The skin incision was closed with running absorbable subcuticular suture (4-0 Monocryl). This was an uncomplicated operation. The patient was extubated uneventfully in the operating room and transferred to the PACU in stable condition with aspiration precautions in effect Complications: None Implants: None Disposition: To PACU extubated and in stable condition with aspiration precautions in effect Charles KAYA FACS Lillie Torres MD

## 2018-10-13 VITALS
SYSTOLIC BLOOD PRESSURE: 143 MMHG | BODY MASS INDEX: 35.85 KG/M2 | WEIGHT: 228.4 LBS | HEIGHT: 67 IN | RESPIRATION RATE: 16 BRPM | HEART RATE: 69 BPM | TEMPERATURE: 98.1 F | DIASTOLIC BLOOD PRESSURE: 83 MMHG | OXYGEN SATURATION: 95 %

## 2018-10-13 LAB
ANION GAP SERPL CALC-SCNC: 13 MMOL/L (ref 5–15)
BUN SERPL-MCNC: 18 MG/DL (ref 6–20)
BUN/CREAT SERPL: 13 (ref 12–20)
CALCIUM SERPL-MCNC: 8.4 MG/DL (ref 8.5–10.1)
CALCIUM SERPL-MCNC: 8.4 MG/DL (ref 8.5–10.1)
CHLORIDE SERPL-SCNC: 101 MMOL/L (ref 97–108)
CO2 SERPL-SCNC: 24 MMOL/L (ref 21–32)
CREAT SERPL-MCNC: 1.36 MG/DL (ref 0.55–1.02)
ERYTHROCYTE [DISTWIDTH] IN BLOOD BY AUTOMATED COUNT: 14.3 % (ref 11.5–14.5)
GLUCOSE SERPL-MCNC: 129 MG/DL (ref 65–100)
HCT VFR BLD AUTO: 39.8 % (ref 35–47)
HGB BLD-MCNC: 12.6 G/DL (ref 11.5–16)
MAGNESIUM SERPL-MCNC: 1.7 MG/DL (ref 1.6–2.4)
MCH RBC QN AUTO: 29.9 PG (ref 26–34)
MCHC RBC AUTO-ENTMCNC: 31.7 G/DL (ref 30–36.5)
MCV RBC AUTO: 94.3 FL (ref 80–99)
NRBC # BLD: 0 K/UL (ref 0–0.01)
NRBC BLD-RTO: 0 PER 100 WBC
PHOSPHATE SERPL-MCNC: 3.6 MG/DL (ref 2.6–4.7)
PLATELET # BLD AUTO: 224 K/UL (ref 150–400)
PMV BLD AUTO: 11.3 FL (ref 8.9–12.9)
POTASSIUM SERPL-SCNC: 3.4 MMOL/L (ref 3.5–5.1)
RBC # BLD AUTO: 4.22 M/UL (ref 3.8–5.2)
SODIUM SERPL-SCNC: 138 MMOL/L (ref 136–145)
WBC # BLD AUTO: 16.9 K/UL (ref 3.6–11)

## 2018-10-13 PROCEDURE — 82310 ASSAY OF CALCIUM: CPT | Performed by: OTOLARYNGOLOGY

## 2018-10-13 PROCEDURE — 85027 COMPLETE CBC AUTOMATED: CPT | Performed by: INTERNAL MEDICINE

## 2018-10-13 PROCEDURE — 94640 AIRWAY INHALATION TREATMENT: CPT

## 2018-10-13 PROCEDURE — 74011000250 HC RX REV CODE- 250: Performed by: OTOLARYNGOLOGY

## 2018-10-13 PROCEDURE — 83735 ASSAY OF MAGNESIUM: CPT | Performed by: OTOLARYNGOLOGY

## 2018-10-13 PROCEDURE — 74011636637 HC RX REV CODE- 636/637: Performed by: OTOLARYNGOLOGY

## 2018-10-13 PROCEDURE — 74011250636 HC RX REV CODE- 250/636: Performed by: OTOLARYNGOLOGY

## 2018-10-13 PROCEDURE — 84100 ASSAY OF PHOSPHORUS: CPT | Performed by: OTOLARYNGOLOGY

## 2018-10-13 PROCEDURE — 99218 HC RM OBSERVATION: CPT

## 2018-10-13 PROCEDURE — 80048 BASIC METABOLIC PNL TOTAL CA: CPT | Performed by: INTERNAL MEDICINE

## 2018-10-13 PROCEDURE — 74011250637 HC RX REV CODE- 250/637: Performed by: OTOLARYNGOLOGY

## 2018-10-13 PROCEDURE — 36415 COLL VENOUS BLD VENIPUNCTURE: CPT | Performed by: OTOLARYNGOLOGY

## 2018-10-13 PROCEDURE — 94760 N-INVAS EAR/PLS OXIMETRY 1: CPT

## 2018-10-13 RX ORDER — ONDANSETRON 8 MG/1
4-8 TABLET, ORALLY DISINTEGRATING ORAL
Qty: 15 TAB | Refills: 1 | Status: SHIPPED | OUTPATIENT
Start: 2018-10-13

## 2018-10-13 RX ORDER — LEVOTHYROXINE SODIUM 125 UG/1
125 TABLET ORAL
Qty: 90 TAB | Refills: 3 | Status: SHIPPED | OUTPATIENT
Start: 2018-10-13

## 2018-10-13 RX ORDER — FERROUS SULFATE, DRIED 160(50) MG
2 TABLET, EXTENDED RELEASE ORAL EVERY 6 HOURS
Qty: 136 TAB | Refills: 1 | Status: SHIPPED | OUTPATIENT
Start: 2018-10-13 | End: 2018-10-27

## 2018-10-13 RX ORDER — OXYCODONE AND ACETAMINOPHEN 5; 325 MG/1; MG/1
1 TABLET ORAL
Qty: 28 TAB | Refills: 0 | Status: SHIPPED | OUTPATIENT
Start: 2018-10-13 | End: 2018-10-20

## 2018-10-13 RX ADMIN — TACROLIMUS 1 MG: 1 CAPSULE ORAL at 09:12

## 2018-10-13 RX ADMIN — CALCIUM CARBONATE-VITAMIN D TAB 500 MG-200 UNIT 2 TABLET: 500-200 TAB at 06:54

## 2018-10-13 RX ADMIN — METOPROLOL SUCCINATE 25 MG: 25 TABLET, EXTENDED RELEASE ORAL at 09:08

## 2018-10-13 RX ADMIN — MYCOPHENOLATE MOFETIL 750 MG: 250 CAPSULE ORAL at 09:11

## 2018-10-13 RX ADMIN — LEVOTHYROXINE SODIUM 125 MCG: 0.12 TABLET ORAL at 09:08

## 2018-10-13 RX ADMIN — ALBUTEROL SULFATE 2.5 MG: 2.5 SOLUTION RESPIRATORY (INHALATION) at 03:33

## 2018-10-13 RX ADMIN — PREDNISONE 20 MG: 20 TABLET ORAL at 09:09

## 2018-10-13 RX ADMIN — ALBUTEROL SULFATE 2.5 MG: 2.5 SOLUTION RESPIRATORY (INHALATION) at 08:38

## 2018-10-13 RX ADMIN — FAMOTIDINE 20 MG: 20 TABLET ORAL at 09:09

## 2018-10-13 RX ADMIN — BUDESONIDE 500 MCG: 0.5 INHALANT RESPIRATORY (INHALATION) at 08:38

## 2018-10-13 RX ADMIN — PANTOPRAZOLE SODIUM 40 MG: 40 TABLET, DELAYED RELEASE ORAL at 06:55

## 2018-10-13 RX ADMIN — FUROSEMIDE 40 MG: 40 TABLET ORAL at 09:09

## 2018-10-13 NOTE — DISCHARGE INSTRUCTIONS
Post Thyroidectomy Instructions    Follow up: with Dr. Karrie Victor 2 weeks after surgery to have your steristrips removed. Shortly after surgery call [phone] to schedule this appointment.  Eat regular foods.  You may shower in 24 hours. Do not allow direct water pressure on your wound. If water trickles down while washing your hair, allow the wound to dry on its own.  Do not scrub or soak your wound for 2 weeks or 14 days.  No strenuous activity: for 14 days.  No moving more than 15 pounds: for 14 days. Then includes pulling, pushing, tugging, throwing.  There is generally not a lot of pain: with this surgery. Take your pain medication as needed. Most patients, if they do have pain, will have neck stiffness/discomfort. You may have numbness or tingling surrounding the area of your wound. Narcotics can cause constipation; use your Colace if this is the case.  Nausea and vomiting: from lingering effects of general anesthesia usually resolves by the following day. The narcotic pain medication can cause nausea and vomiting. They should be taken with food or fluids to minimize this. Medications that reduce nausea and vomiting can be prescribed by your physician.  Fever above 100.4, redness around wound, pus drainage from wound: call your doctor.  Bleeding: is uncommon (less than 1%). If it does occur your neck will develop a fullness. It is good to take a look at your neck shortly after surgery to see what a baseline appearance is. If there are changes to this, then call your provider. \    CALL or TEXT Dr. Karrie Victor for questions or concerns - text works best - [email or web address]  Instructions for if you had both sides of your thyroid removed or the remainder of your thyroid removed:    o You may be on Calcium (Oscal) - it is very important that you take this.   Dr. Karrie Victor will start a taper at your follow up visit  o If you experience tingling in the hands or around your mouth, your calcium may be dropping, go to the emergency room immediately and tell them you had your thyroid removed. You will be started on a dose of thyroid hormone replacement if you did not have high levels of thyroid hormone prior to surgery. Take this every day.

## 2018-10-13 NOTE — PROGRESS NOTES
Dr. Paulino Lefort into see pt. md updated on pt's condition and lab work .  Doctor is d/c pt to home today. kai mullins rn

## 2018-10-13 NOTE — CONSULTS
Hospitalist Consult Note  Rudy Lopez MD  Answering service: [phone] OR 36 from in house phone  Cell: [phone]      Date of Service:  10/13/2018  NAME:  Anselmo Alva  :  1960  MRN:  [id number]      Admission Summary:     Anselmo Alva is a 66 y.o. white female with past medical history of arthritis, asthma, CKD, s/p kidney transplant, GERD, hypertension and thyroid nodule presented for admission today with diagnoses of multi-nodular goiter with dominant 1.8 cm left lower thyroid nodule. Patient is now s/p neck exploration, total thyroidectomy, and laryngeal nerve monitoring. Interval history / Subjective:     Patient states she has some mild sore throat and had a \"cold\" the past couple of days. No major complaints. Viann Carlos to go home. Assessment & Plan:     Asthma/ COPD. Already on Albuterol, Brovana and Pulmicort. -no wheezing and no current exacerbation. Hypertension: On Metoprolol and Furosemide.       CKD s/p kidney transplant: On Cellcept and Prograf.      GERD: On Protonix and Pepcid.       Tobacco abuse: Encourage smoking cessation.       Obesity: Would recommend weight loss/ heart healthy diet/ and lifestyle modification.     Thank you for this consult. Stable for discharge from my standpoint. Will follow while inpatient. Hospital Problems  Date Reviewed: 10/12/2018          Codes Class Noted POA    Multinodular goiter ICD-10-CM: E04.2  ICD-9-CM: 241.1  10/12/2018 Unknown        Dysphagia ICD-10-CM: R13.10  ICD-9-CM: 787.20  10/12/2018 Unknown        * (Principal)COPD (chronic obstructive pulmonary disease) ([facility] ICD-10-CM: J44.9  ICD-9-CM: 496  10/12/2018 Unknown              Review of Systems:   A comprehensive review of systems was negative except for that written in the HPI. Vital Signs:    Last 24hrs VS reviewed since prior progress note.  Most recent are:  Visit Vitals    /83 (BP 1 Location: Right arm, BP Patient Position: At rest)    Pulse 69    Temp 98.1 °F (36.7 °C)    Resp 16    Ht 5' 7\" (1.702 m)    Wt 103.6 kg (228 lb 6.3 oz)    SpO2 95%    BMI 35.77 kg/m2         Intake/Output Summary (Last 24 hours) at 10/13/18 1136  Last data filed at 10/13/18 0436   Gross per 24 hour   Intake              750 ml   Output              655 ml   Net               95 ml        Physical Examination:       Constitutional:  No acute distress, cooperative, pleasant    ENT:  Neck supple, midline bandaging c/d/i   Resp:  CTA bilaterally. No accessory muscle use   CV:  Regular rhythm, normal rate, no murmur    GI:  Soft, non distended, non tender. normoactive bowel sounds    Musculoskeletal:  No edema, warm    Neurologic:  Moves all extremities. AAOx3     Data Review:    Review and/or order of clinical lab test  Review and/or order of tests in the radiology section of CPT  Review and/or order of tests in the medicine section of CPT    Labs:     Recent Labs      10/13/18   0945   WBC  16.9*   HGB  12.6   HCT  39.8   PLT  224     Recent Labs      10/13/18   0945  10/13/18   0702  10/12/18   2200   NA  138   --    --    K  3.4*   --    --    CL  101   --    --    CO2  24   --    --    BUN  18   --    --    CREA  1.36*   --    --    GLU  129*   --    --    CA  8.4*  8.4*  8.4*   MG   --   1.7  1.6   PHOS   --   3.6  3.2     No results for input(s): SGOT, GPT, ALT, AP, TBIL, TBILI, TP, ALB, GLOB, GGT, AML, LPSE in the last 72 hours. No lab exists for component: AMYP, HLPSE  No results for input(s): INR, PTP, APTT in the last 72 hours. No lab exists for component: INREXT   No results for input(s): FE, TIBC, PSAT, FERR in the last 72 hours. No results found for: FOL, RBCF   No results for input(s): PH, PCO2, PO2 in the last 72 hours. No results for input(s): CPK, CKNDX, TROIQ in the last 72 hours.     No lab exists for component: CPKMB  No results found for: CHOL, CHOLX, CHLST, CHOLV, HDL, LDL, LDLC, DLDLP, TGLX, TRIGL, TRIGP, CHHD, CHHDX  No results found for: GLUCPOC  No results found for: COLOR, APPRN, SPGRU, REFSG, JOHN, PROTU, GLUCU, KETU, BILU, UROU, BRUNA, LEUKU, GLUKE, EPSU, BACTU, WBCU, RBCU, CASTS, UCRY      Medications Reviewed:     Current Facility-Administered Medications   Medication Dose Route Frequency    famotidine (PEPCID) tablet 20 mg  20 mg Oral DAILY    furosemide (LASIX) tablet 40 mg  40 mg Oral DAILY    metoprolol succinate (TOPROL-XL) XL tablet 25 mg  25 mg Oral DAILY    mycophenolate mofetil (CELLCEPT) capsule 750 mg  750 mg Oral BID    pantoprazole (PROTONIX) tablet 40 mg  40 mg Oral ACB    predniSONE (DELTASONE) tablet 20 mg  20 mg Oral DAILY    tacrolimus (PROGRAF) capsule 1 mg  1 mg Oral Q12H    sodium chloride (NS) flush 5-10 mL  5-10 mL IntraVENous Q8H    sodium chloride (NS) flush 5-10 mL  5-10 mL IntraVENous PRN    dextrose 5% - 0.45% NaCl with KCl 20 mEq/L infusion  25 mL/hr IntraVENous CONTINUOUS    acetaminophen (TYLENOL) tablet 650 mg  650 mg Oral Q6H PRN    oxyCODONE-acetaminophen (PERCOCET) 5-325 mg per tablet 1 Tab  1 Tab Oral Q4H PRN    oxyCODONE-acetaminophen (PERCOCET 10)  mg per tablet 1 Tab  1 Tab Oral Q4H PRN    ondansetron (ZOFRAN) injection 4 mg  4 mg IntraVENous Q4H PRN    levothyroxine (SYNTHROID) tablet 125 mcg  125 mcg Oral DAILY    calcium-vitamin D (OS-LARS) 500 mg-200 unit tablet  2 Tab Oral Q6H    albuterol (PROVENTIL VENTOLIN) nebulizer solution 2.5 mg  2.5 mg Nebulization Q4H RT    arformoterol (BROVANA) neb solution 15 mcg  15 mcg Nebulization BID RT    And    budesonide (PULMICORT) 500 mcg/2 ml nebulizer suspension  500 mcg Nebulization BID RT    carboxymethylcellulose sodium (REFRESH PLUS) 0.5 % ophthalmic solution 1 Drop  1 Drop Both Eyes PRN     ______________________________________________________________________  EXPECTED LENGTH OF STAY: - - -  ACTUAL LENGTH OF STAY:          0                 Shyla Helm MD
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
## 2021-08-26 ENCOUNTER — ANESTHESIA (OUTPATIENT)
Dept: ENDOSCOPY | Age: 61
End: 2021-08-26
Payer: MEDICARE

## 2021-08-26 ENCOUNTER — HOSPITAL ENCOUNTER (OUTPATIENT)
Age: 61
Setting detail: OUTPATIENT SURGERY
Discharge: HOME | End: 2021-08-26
Attending: INTERNAL MEDICINE | Admitting: INTERNAL MEDICINE
Payer: MEDICARE

## 2021-08-26 ENCOUNTER — ANESTHESIA EVENT (OUTPATIENT)
Dept: ENDOSCOPY | Age: 61
End: 2021-08-26
Payer: MEDICARE

## 2021-08-26 VITALS
RESPIRATION RATE: 18 BRPM | OXYGEN SATURATION: 97 % | TEMPERATURE: 97.8 F | DIASTOLIC BLOOD PRESSURE: 96 MMHG | HEART RATE: 71 BPM | WEIGHT: 212 LBS | HEIGHT: 66 IN | BODY MASS INDEX: 34.07 KG/M2 | SYSTOLIC BLOOD PRESSURE: 159 MMHG

## 2021-08-26 PROCEDURE — 76040000019: Performed by: INTERNAL MEDICINE

## 2021-08-26 PROCEDURE — 77030021593 HC FCPS BIOP ENDOSC BSC -A: Performed by: INTERNAL MEDICINE

## 2021-08-26 PROCEDURE — 74011250636 HC RX REV CODE- 250/636: Performed by: INTERNAL MEDICINE

## 2021-08-26 PROCEDURE — 88342 IMHCHEM/IMCYTCHM 1ST ANTB: CPT

## 2021-08-26 PROCEDURE — 74011250636 HC RX REV CODE- 250/636: Performed by: NURSE ANESTHETIST, CERTIFIED REGISTERED

## 2021-08-26 PROCEDURE — 76060000031 HC ANESTHESIA FIRST 0.5 HR: Performed by: INTERNAL MEDICINE

## 2021-08-26 PROCEDURE — 88305 TISSUE EXAM BY PATHOLOGIST: CPT

## 2021-08-26 PROCEDURE — 74011000250 HC RX REV CODE- 250: Performed by: NURSE ANESTHETIST, CERTIFIED REGISTERED

## 2021-08-26 PROCEDURE — 2709999900 HC NON-CHARGEABLE SUPPLY: Performed by: INTERNAL MEDICINE

## 2021-08-26 RX ORDER — SODIUM CHLORIDE 9 MG/ML
INJECTION, SOLUTION INTRAVENOUS
Status: DISCONTINUED | OUTPATIENT
Start: 2021-08-26 | End: 2021-08-26 | Stop reason: HOSPADM

## 2021-08-26 RX ORDER — SODIUM CHLORIDE 9 MG/ML
50 INJECTION, SOLUTION INTRAVENOUS CONTINUOUS
Status: DISCONTINUED | OUTPATIENT
Start: 2021-08-26 | End: 2021-08-26 | Stop reason: HOSPADM

## 2021-08-26 RX ORDER — SODIUM CHLORIDE 0.9 % (FLUSH) 0.9 %
5-40 SYRINGE (ML) INJECTION AS NEEDED
Status: DISCONTINUED | OUTPATIENT
Start: 2021-08-26 | End: 2021-08-26 | Stop reason: HOSPADM

## 2021-08-26 RX ORDER — NALOXONE HYDROCHLORIDE 0.4 MG/ML
0.4 INJECTION, SOLUTION INTRAMUSCULAR; INTRAVENOUS; SUBCUTANEOUS
Status: DISCONTINUED | OUTPATIENT
Start: 2021-08-26 | End: 2021-08-26 | Stop reason: HOSPADM

## 2021-08-26 RX ORDER — DEXTROMETHORPHAN/PSEUDOEPHED 2.5-7.5/.8
1.2 DROPS ORAL
Status: DISCONTINUED | OUTPATIENT
Start: 2021-08-26 | End: 2021-08-26 | Stop reason: HOSPADM

## 2021-08-26 RX ORDER — FLUMAZENIL 0.1 MG/ML
0.2 INJECTION INTRAVENOUS
Status: DISCONTINUED | OUTPATIENT
Start: 2021-08-26 | End: 2021-08-26 | Stop reason: HOSPADM

## 2021-08-26 RX ORDER — LIDOCAINE HYDROCHLORIDE 20 MG/ML
INJECTION, SOLUTION EPIDURAL; INFILTRATION; INTRACAUDAL; PERINEURAL AS NEEDED
Status: DISCONTINUED | OUTPATIENT
Start: 2021-08-26 | End: 2021-08-26 | Stop reason: HOSPADM

## 2021-08-26 RX ORDER — PROPOFOL 10 MG/ML
INJECTION, EMULSION INTRAVENOUS AS NEEDED
Status: DISCONTINUED | OUTPATIENT
Start: 2021-08-26 | End: 2021-08-26 | Stop reason: HOSPADM

## 2021-08-26 RX ORDER — SODIUM CHLORIDE 0.9 % (FLUSH) 0.9 %
5-40 SYRINGE (ML) INJECTION EVERY 8 HOURS
Status: DISCONTINUED | OUTPATIENT
Start: 2021-08-26 | End: 2021-08-26 | Stop reason: HOSPADM

## 2021-08-26 RX ORDER — EPINEPHRINE 0.1 MG/ML
1 INJECTION INTRACARDIAC; INTRAVENOUS
Status: DISCONTINUED | OUTPATIENT
Start: 2021-08-26 | End: 2021-08-26 | Stop reason: HOSPADM

## 2021-08-26 RX ORDER — ATROPINE SULFATE 0.1 MG/ML
0.5 INJECTION INTRAVENOUS
Status: DISCONTINUED | OUTPATIENT
Start: 2021-08-26 | End: 2021-08-26 | Stop reason: HOSPADM

## 2021-08-26 RX ADMIN — PROPOFOL 50 MG: 10 INJECTION, EMULSION INTRAVENOUS at 15:32

## 2021-08-26 RX ADMIN — PROPOFOL 40 MG: 10 INJECTION, EMULSION INTRAVENOUS at 15:31

## 2021-08-26 RX ADMIN — PROPOFOL 120 MG: 10 INJECTION, EMULSION INTRAVENOUS at 15:27

## 2021-08-26 RX ADMIN — PROPOFOL 40 MG: 10 INJECTION, EMULSION INTRAVENOUS at 15:29

## 2021-08-26 RX ADMIN — SODIUM CHLORIDE: 900 INJECTION, SOLUTION INTRAVENOUS at 15:24

## 2021-08-26 RX ADMIN — LIDOCAINE HYDROCHLORIDE 100 MG: 20 INJECTION, SOLUTION EPIDURAL; INFILTRATION; INTRACAUDAL; PERINEURAL at 15:27

## 2021-08-26 NOTE — ANESTHESIA PREPROCEDURE EVALUATION
Anesthetic History   No history of anesthetic complications            Review of Systems / Medical History  Patient summary reviewed, nursing notes reviewed and pertinent labs reviewed    Pulmonary            Asthma : well controlled       Neuro/Psych   Within defined limits           Cardiovascular    Hypertension              Exercise tolerance: >4 METS     GI/Hepatic/Renal     GERD    Renal disease      Comments: Dysphagia  Renal tx Endo/Other        Morbid obesity     Other Findings   Comments: S/p Renal tx           Physical Exam    Airway  Mallampati: I  TM Distance: > 6 cm  Neck ROM: normal range of motion   Mouth opening: Normal     Cardiovascular    Rhythm: regular  Rate: normal         Dental         Pulmonary  Breath sounds clear to auscultation               Abdominal         Other Findings            Anesthetic Plan    ASA: 3  Anesthesia type: MAC          Induction: Intravenous  Anesthetic plan and risks discussed with: Patient

## 2021-08-26 NOTE — DISCHARGE INSTRUCTIONS
[address] [facility].  [address] [address] [address], [place] [address]  [phone]                     DISCHARGE INSTRUCTIONS    Daiana Falcon  [id number]  1960    DISCOMFORT:  Sore throat- throat lozenges or warm salt water gargle  redness at IV site- apply warm compress to area; if redness or soreness persist- contact your physician  Gaseous discomfort- walking, belching will help relieve any discomfort    DIET  You may eat and drink after you leave. You may resume your regular diet - however -  remember your colon is empty and a heavy meal will produce gas. Avoid these foods:  vegetables, fried / greasy foods, carbonated drinks   You may not drink alcoholic beverages for at least 12 hours    ACTIVITY  You may resume your normal daily activities   Spend the remainder of the day resting -  avoid any strenuous activity. You may not operate a vehicle for 12 hours  You may not engage in an occupation involving machinery or appliances for rest of today  Avoid making any critical decisions for at least 24 hour    CALL M.D. ANY SIGN OF   Increasing pain, nausea, vomiting  Abdominal distension (swelling)  New increased bleeding (oral or rectal)  Fever (chills)  Pain in chest area  Bloody discharge from nose or mouth  Shortness of breath    Follow-up Instructions:   Call Dr. Sid Burnett for any questions or problems. If we took a biopsy please call the office within 2 weeks to discuss your pathology results. Telephone # [phone]       ENDOSCOPY FINDINGS:   1. Gastritis  2. Hiatal Hernia       Post-procedure recommendations: -Await pathology. , -Follow symptoms. ,   -Patient to follow up with her Nephrologist for further evaluation of RLQ as as seen on Ultrasound (Hard copy of Ultrasound report given to patient)      Learning About Coronavirus (COVID-19)  Coronavirus [phone]): Overview  What is coronavirus (COVID-19)? The coronavirus disease (COVID-19) is caused by a virus.  It is an illness that was first found in [facility], in December 2019. It has since spread worldwide. The virus can cause fever, cough, and trouble breathing. In severe cases, it can cause pneumonia and make it hard to breathe without help. It can cause death. Coronaviruses are a large group of viruses. They cause the common cold. They also cause more serious illnesses like Middle East respiratory syndrome (MERS) and severe acute respiratory syndrome (SARS). COVID-19 is caused by a novel coronavirus. That means it's a new type that has not been seen in people before. This virus spreads person-to-person through droplets from coughing and sneezing. It can also spread when you are close to someone who is infected. And it can spread when you touch something that has the virus on it, such as a doorknob or a tabletop. What can you do to protect yourself from coronavirus (COVID-19)? The best way to protect yourself from getting sick is to:  · Avoid areas where there is an outbreak. · Avoid contact with people who may be infected. · Wash your hands often with soap or alcohol-based hand sanitizers. · Avoid crowds and try to stay at least 6 feet away from other people. · Wash your hands often, especially after you cough or sneeze. Use soap and water, and scrub for at least 20 seconds. If soap and water aren't available, use an alcohol-based hand . · Avoid touching your mouth, nose, and eyes. What can you do to avoid spreading the virus to others? To help avoid spreading the virus to others:  · Cover your mouth with a tissue when you cough or sneeze. Then throw the tissue in the trash. · Use a disinfectant to clean things that you touch often. · Stay home if you are sick or have been exposed to the virus. Don't go to school, work, or public areas. And don't use public transportation. · If you are sick:  ? Leave your home only if you need to get medical care. But call the doctor's office first so they know you're coming.  And wear a face mask, if you have one.  ? If you have a face mask, wear it whenever you're around other people. It can help stop the spread of the virus when you cough or sneeze. ? Clean and disinfect your home every day. Use household  and disinfectant wipes or sprays. Take special care to clean things that you grab with your hands. These include doorknobs, remote controls, phones, and handles on your refrigerator and microwave. And don't forget countertops, tabletops, bathrooms, and computer keyboards. When to call for help  Call 911 anytime you think you may need emergency care. For example, call if:  · You have severe trouble breathing. (You can't talk at all.)  · You have constant chest pain or pressure. · You are severely dizzy or lightheaded. · You are confused or can't think clearly. · Your face and lips have a blue color. · You pass out (lose consciousness) or are very hard to wake up. Call your doctor now if you develop symptoms such as:  · Shortness of breath. · Fever. · Cough. If you need to get care, call ahead to the doctor's office for instructions before you go. Make sure you wear a face mask, if you have one, to prevent exposing other people to the virus. Where can you get the latest information? The following health organizations are tracking and studying this virus. Their websites contain the most up-to-date information. [employer] also learn what to do if you think you may have been exposed to the virus. · U.S. Centers for Disease Control and Prevention (CDC): The CDC provides updated news about the disease and travel advice. The website also tells you how to prevent the spread of infection. www.cdc.gov  · World Health Organization [facility]): WHO offers information about the virus outbreaks. WHO also has travel advice. www.who.int  Current as of: April 1, 2020               Content Version: 12.4  © 7874-2608 [employer].    Care instructions adapted under license by your healthcare professional. If you have questions about a medical condition or this instruction, always ask your healthcare professional. [address] [address] any warranty or liability for your use of this information.

## 2021-08-26 NOTE — ROUTINE PROCESS
Lyndon Artis  1960  [id number]    Situation:  Verbal report received from: MIGUEL Cortes RN  Procedure: Procedure(s):  ESOPHAGOGASTRODUODENOSCOPY (EGD)  ESOPHAGOGASTRODUODENAL (EGD) BIOPSY    Background:    Preoperative diagnosis: GERD, UPPER ABDOMINAL PAIN, KIDNEY TRANSPLANT RECIPIENT, TYPE 2 DIABETES  Postoperative diagnosis: 1. Gastritis  2. Hiatal Hernia    :  Dr. Quan Todd  Assistant(s): Endoscopy Technician-1: Henny Lanza IV  Endoscopy RN-1: Maira Holder    Specimens:   ID Type Source Tests Collected by Time Destination   1 : Gastric Biopsy Preservative Gastric  Anahy Mesa MD 8/26/2021 1532 Pathology     H. Pylori  no    Assessment:  Intra-procedure medications   Anesthesia gave intra-procedure sedation and medications, see anesthesia flow sheet yes    Intravenous fluids: NS@ KVO     Vital signs stable     Abdominal assessment: round and soft     Recommendation:  Discharge patient per MD order.     Family or Friend   Permission to share finding with family or friend yes

## 2021-08-26 NOTE — ANESTHESIA POSTPROCEDURE EVALUATION
Post-Anesthesia Evaluation and Assessment    Patient: Lonny Cardona MRN: [id number]  SSN: [id number]    YOB: 1960  Age: 63 y.o. Sex: female      I have evaluated the patient and they are stable and ready for discharge from the PACU. Cardiovascular Function/Vital Signs  Visit Vitals  BP (!) 148/94   Pulse 67   Temp 36.6 °C (97.8 °F)   Resp 15   Ht 5' 6\" (1.676 m)   Wt 96.2 kg (212 lb)   SpO2 98%   Breastfeeding No   BMI 34.22 kg/m²       Patient is status post MAC anesthesia for Procedure(s):  ESOPHAGOGASTRODUODENOSCOPY (EGD)  ESOPHAGOGASTRODUODENAL (EGD) BIOPSY. Nausea/Vomiting: None    Postoperative hydration reviewed and adequate. Pain:  Pain Scale 1: Numeric (0 - 10) (08/26/21 1547)  Pain Intensity 1: 0 (08/26/21 1547)   Managed    Neurological Status: At baseline    Mental Status, Level of Consciousness: Alert and  oriented to person, place, and time    Pulmonary Status:   O2 Device: None (Room air) (08/26/21 1547)   Adequate oxygenation and airway patent    Complications related to anesthesia: None    Post-anesthesia assessment completed. No concerns    Signed By: Lauren Jimenes MD     August 26, 2021              Procedure(s):  ESOPHAGOGASTRODUODENOSCOPY (EGD)  ESOPHAGOGASTRODUODENAL (EGD) BIOPSY. MAC    <BSHSIANPOST>    INITIAL Post-op Vital signs:   Vitals Value Taken Time   /94 08/26/21 1600   Temp 36.6 °C (97.8 °F) 08/26/21 1547   Pulse 67 08/26/21 1606   Resp 17 08/26/21 1606   SpO2 98 % 08/26/21 1606   Vitals shown include unvalidated device data.

## 2021-08-26 NOTE — PROCEDURES
[address]  [address] [facility], [address]  [phone]                            NAME:  Josh Bishop   :   1960   MRN:   [id number]     Date/Time:  2021 3:36 PM    Esophagogastroduodenoscopy (EGD) Procedure Note    :  Re Davis MD    Staff: Endoscopy Technician-1: Kamryn Johnson  Endoscopy RN-1: Scotite Kaur     Implants: none    Referring Provider:  Patricia Lubin MD    Anethesia/Sedation:  MAC anesthesia    Procedure Details     After infom consent was obtained for the procedure, with all risks and benefits of procedure explained the patient was taken to the endoscopy suite and placed in the left lateral decubitus position. Following sequential administration of sedation as per above, the [id number] gastroscope was inserted into the mouth and advanced under direct vision to second portion of the duodenum. A careful inspection was made as the gastroscope was withdrawn, including a retroflexed view of the proximal stomach; findings and interventions are described below. Findings:  Esophagus: Small sliding hiatal hernia was noted with Z-line at 38 cm and diaphragmatic pinch at 40 cm. Esophageal mucosa was normal.  Stomach:mild erythema was noted in  body, antrum  Duodenum/jejunum:normal      Therapies:  biopsy of stomach body, antrum    Specimens: biopsy of stomach body, antrum           EBL: None    Complications:   None; patient tolerated the procedure well. Impression:    See Postoperative diagnosis above    Recommendations:  -Continue acid suppression. , -Await pathology. , -Follow symptoms. ,   -Patient to follow up with her Nephrologist for further evaluation of RLQ as as seen on Ultrasound (Hard copy of Ultrasound report given to patient)    Discharge disposition:  Home in the company of  when able to ambulate    Re Davis MD

## (undated) DEVICE — SET ADMIN 16ML TBNG L100IN 2 Y INJ SITE IV PIGGY BK DISP

## (undated) DEVICE — HOOK RETRCT L5MM E SHRP SELF RET SYS LONE STAR

## (undated) DEVICE — BIPOLAR FORCEPS CORD: Brand: VALLEYLAB

## (undated) DEVICE — SUTURE VCRL SZ 3-0 L27IN ABSRB UD L26MM SH 1/2 CIR J416H

## (undated) DEVICE — BITE BLK ENDOSCP AD 54FR GRN POLYETH ENDOSCP W STRP SLD

## (undated) DEVICE — AGENT HEMSTAT W2XL3IN OXIDIZED REGENERATED CELOS ABSRB

## (undated) DEVICE — INFECTION CONTROL KIT SYS

## (undated) DEVICE — CLIP INT SM WIDE RED TI TRNSVRS GRV CHEVRON SHP W/ PRECIS

## (undated) DEVICE — TOWEL SURG W17XL27IN STD BLU COT NONFENESTRATED PREWASHED

## (undated) DEVICE — SHEAR RMFG HARMONIC FOCUS 9CM -- OEM ITEM L#322125

## (undated) DEVICE — SOLUTION IV 1000ML 0.9% SOD CHL

## (undated) DEVICE — SPONGE: SPECIALTY PEANUT XR 100/CS: Brand: MEDICAL ACTION INDUSTRIES

## (undated) DEVICE — SYRINGE MED 20ML STD CLR PLAS LUERLOCK TIP N CTRL DISP

## (undated) DEVICE — SYR 10ML LUER LOK 1/5ML GRAD --

## (undated) DEVICE — NEEDLE HYPO 18GA L1.5IN PNK S STL HUB POLYPR SHLD REG BVL

## (undated) DEVICE — Device

## (undated) DEVICE — PROBE 8225101 5PK STD PRASS FL TIP ROHS

## (undated) DEVICE — TUBING HYDR IRR --

## (undated) DEVICE — MASTISOL ADHESIVE LIQ 2/3ML

## (undated) DEVICE — NEONATAL-ADULT SPO2 SENSOR: Brand: NELLCOR

## (undated) DEVICE — MAGNETIC DRAPE: Brand: DEVON

## (undated) DEVICE — BAG SPEC BIOHZD LF 2MIL 6X10IN -- CONVERT TO ITEM 357326

## (undated) DEVICE — ENDO CARRY-ON PROCEDURE KIT INCLUDES ENZYMATIC SPONGE, GAUZE, BIOHAZARD LABEL, TRAY, LUBRICANT, DIRTY SCOPE LABEL, WATER LABEL, TRAY, DRAWSTRING PAD, AND DEFENDO 4-PIECE KIT.: Brand: ENDO CARRY-ON PROCEDURE KIT

## (undated) DEVICE — FORCEPS BX L240CM JAW DIA2.8MM L CAP W/ NDL MIC MESH TOOTH

## (undated) DEVICE — BW-412T DISP COMBO CLEANING BRUSH: Brand: SINGLE USE COMBINATION CLEANING BRUSH

## (undated) DEVICE — SUTURE MCRYL SZ 4-0 L27IN ABSRB UD L19MM PS-2 1/2 CIR PRIM Y426H

## (undated) DEVICE — Z DISCONTINUED NO SUB IDED SET EXTN W/ 4 W STPCOCK M SPIN LOK 36IN

## (undated) DEVICE — REM POLYHESIVE ADULT PATIENT RETURN ELECTRODE: Brand: VALLEYLAB

## (undated) DEVICE — CANN NASAL O2 CAPNOGRAPHY AD -- FILTERLINE

## (undated) DEVICE — 1200 GUARD II KIT W/5MM TUBE W/O VAC TUBE: Brand: GUARDIAN

## (undated) DEVICE — EMG TUBE 8229707 NIM TRIVANTAGE 7.0MM ID: Brand: NIM TRIVANTAGE™

## (undated) DEVICE — SUT SLK 2-0SH 30IN BLK --

## (undated) DEVICE — SOLIDIFIER FLUID 3000 CC ABSORB

## (undated) DEVICE — INSULATED BLADE ELECTRODE: Brand: EDGE

## (undated) DEVICE — Z DISCONTINUED GLOVE SURG SZ 7 L12IN FNGR THK13MIL WHT ISOLEX POLYISOPRENE

## (undated) DEVICE — ROCKER SWITCH PENCIL BLADE ELECTRODE, HOLSTER: Brand: EDGE

## (undated) DEVICE — (D)PREP SKN CHLRAPRP APPL 26ML -- CONVERT TO ITEM 371833

## (undated) DEVICE — PACK,EENT,TURBAN DRAPE,PK II: Brand: MEDLINE

## (undated) DEVICE — KENDALL RADIOLUCENT FOAM MONITORING ELECTRODE -RECTANGULAR SHAPE: Brand: KENDALL

## (undated) DEVICE — CONTAINER SPEC 20 ML LID NEUT BUFF FORMALIN 10 % POLYPR STS

## (undated) DEVICE — GOWN,SIRUS,FABRNF,XL,20/CS: Brand: MEDLINE

## (undated) DEVICE — BAG BELONG PT PERS CLEAR HANDL

## (undated) DEVICE — CATH IV AUTOGRD BC BLU 22GA 25 -- INSYTE

## (undated) DEVICE — KIT IV STRT W CHLORAPREP PD 1ML

## (undated) DEVICE — SURGICAL PROCEDURE PACK BASIN MAJ SET CUST NO CAUT

## (undated) DEVICE — (D)STRIP SKN CLSR 0.5X4IN WHT --